# Patient Record
Sex: MALE | Race: WHITE | Employment: UNEMPLOYED | URBAN - NONMETROPOLITAN AREA
[De-identification: names, ages, dates, MRNs, and addresses within clinical notes are randomized per-mention and may not be internally consistent; named-entity substitution may affect disease eponyms.]

---

## 2019-03-10 ENCOUNTER — APPOINTMENT (OUTPATIENT)
Dept: CT IMAGING | Age: 59
DRG: 194 | End: 2019-03-10

## 2019-03-10 ENCOUNTER — APPOINTMENT (OUTPATIENT)
Dept: GENERAL RADIOLOGY | Age: 59
DRG: 194 | End: 2019-03-10

## 2019-03-10 ENCOUNTER — HOSPITAL ENCOUNTER (INPATIENT)
Age: 59
LOS: 3 days | Discharge: HOME | DRG: 194 | End: 2019-03-13
Attending: EMERGENCY MEDICINE | Admitting: INTERNAL MEDICINE

## 2019-03-10 DIAGNOSIS — J10.1 INFLUENZA A: ICD-10-CM

## 2019-03-10 DIAGNOSIS — R00.1 BRADYCARDIA: Primary | ICD-10-CM

## 2019-03-10 LAB
A/G RATIO: 1.2 (ref 1.1–2.2)
ALBUMIN SERPL-MCNC: 4.2 G/DL (ref 3.4–5)
ALP BLD-CCNC: 68 U/L (ref 40–129)
ALT SERPL-CCNC: 21 U/L (ref 10–40)
AMPHETAMINE SCREEN, URINE: ABNORMAL
ANION GAP SERPL CALCULATED.3IONS-SCNC: 13 MMOL/L (ref 3–16)
AST SERPL-CCNC: 22 U/L (ref 15–37)
BARBITURATE SCREEN URINE: ABNORMAL
BASOPHILS ABSOLUTE: 0 K/UL (ref 0–0.2)
BASOPHILS RELATIVE PERCENT: 0.6 %
BENZODIAZEPINE SCREEN, URINE: ABNORMAL
BILIRUB SERPL-MCNC: 0.3 MG/DL (ref 0–1)
BILIRUBIN URINE: NEGATIVE
BLOOD, URINE: ABNORMAL
BUN BLDV-MCNC: 31 MG/DL (ref 7–20)
CALCIUM SERPL-MCNC: 9.5 MG/DL (ref 8.3–10.6)
CANNABINOID SCREEN URINE: POSITIVE
CHLORIDE BLD-SCNC: 100 MMOL/L (ref 99–110)
CLARITY: CLEAR
CO2: 26 MMOL/L (ref 21–32)
COCAINE METABOLITE SCREEN URINE: ABNORMAL
COLOR: YELLOW
CREAT SERPL-MCNC: 0.9 MG/DL (ref 0.9–1.3)
EOSINOPHILS ABSOLUTE: 0 K/UL (ref 0–0.6)
EOSINOPHILS RELATIVE PERCENT: 0 %
EPITHELIAL CELLS, UA: ABNORMAL /HPF
GFR AFRICAN AMERICAN: >60
GFR NON-AFRICAN AMERICAN: >60
GLOBULIN: 3.6 G/DL
GLUCOSE BLD-MCNC: 127 MG/DL (ref 70–99)
GLUCOSE URINE: NEGATIVE MG/DL
HCT VFR BLD CALC: 48 % (ref 40.5–52.5)
HEMOGLOBIN: 15.9 G/DL (ref 13.5–17.5)
KETONES, URINE: ABNORMAL MG/DL
LEUKOCYTE ESTERASE, URINE: NEGATIVE
LIPASE: 12 U/L (ref 13–60)
LYMPHOCYTES ABSOLUTE: 0.6 K/UL (ref 1–5.1)
LYMPHOCYTES RELATIVE PERCENT: 10.3 %
Lab: ABNORMAL
MCH RBC QN AUTO: 31 PG (ref 26–34)
MCHC RBC AUTO-ENTMCNC: 33.1 G/DL (ref 31–36)
MCV RBC AUTO: 93.9 FL (ref 80–100)
METHADONE SCREEN, URINE: ABNORMAL
MICROSCOPIC EXAMINATION: YES
MONOCYTES ABSOLUTE: 0.6 K/UL (ref 0–1.3)
MONOCYTES RELATIVE PERCENT: 9.9 %
MUCUS: ABNORMAL /LPF
NEUTROPHILS ABSOLUTE: 4.6 K/UL (ref 1.7–7.7)
NEUTROPHILS RELATIVE PERCENT: 79.2 %
NITRITE, URINE: NEGATIVE
OPIATE SCREEN URINE: ABNORMAL
OXYCODONE URINE: ABNORMAL
PDW BLD-RTO: 14.2 % (ref 12.4–15.4)
PH UA: 5
PH UA: 5 (ref 5–8)
PHENCYCLIDINE SCREEN URINE: ABNORMAL
PLATELET # BLD: 181 K/UL (ref 135–450)
PMV BLD AUTO: 8.8 FL (ref 5–10.5)
POTASSIUM SERPL-SCNC: 4.3 MMOL/L (ref 3.5–5.1)
PROPOXYPHENE SCREEN: ABNORMAL
PROTEIN UA: 30 MG/DL
RAPID INFLUENZA  B AGN: NEGATIVE
RAPID INFLUENZA A AGN: POSITIVE
RBC # BLD: 5.11 M/UL (ref 4.2–5.9)
RBC UA: ABNORMAL /HPF (ref 0–2)
SODIUM BLD-SCNC: 139 MMOL/L (ref 136–145)
SPECIFIC GRAVITY UA: >=1.03 (ref 1–1.03)
TOTAL PROTEIN: 7.8 G/DL (ref 6.4–8.2)
TROPONIN: <0.01 NG/ML
URINE TYPE: ABNORMAL
UROBILINOGEN, URINE: 0.2 E.U./DL
WBC # BLD: 5.8 K/UL (ref 4–11)
WBC UA: ABNORMAL /HPF (ref 0–5)

## 2019-03-10 PROCEDURE — G0378 HOSPITAL OBSERVATION PER HR: HCPCS

## 2019-03-10 PROCEDURE — 6360000002 HC RX W HCPCS: Performed by: EMERGENCY MEDICINE

## 2019-03-10 PROCEDURE — 83690 ASSAY OF LIPASE: CPT

## 2019-03-10 PROCEDURE — 99285 EMERGENCY DEPT VISIT HI MDM: CPT

## 2019-03-10 PROCEDURE — 70450 CT HEAD/BRAIN W/O DYE: CPT

## 2019-03-10 PROCEDURE — 2580000003 HC RX 258: Performed by: INTERNAL MEDICINE

## 2019-03-10 PROCEDURE — 36415 COLL VENOUS BLD VENIPUNCTURE: CPT

## 2019-03-10 PROCEDURE — 96374 THER/PROPH/DIAG INJ IV PUSH: CPT

## 2019-03-10 PROCEDURE — 6370000000 HC RX 637 (ALT 250 FOR IP): Performed by: INTERNAL MEDICINE

## 2019-03-10 PROCEDURE — 2060000000 HC ICU INTERMEDIATE R&B

## 2019-03-10 PROCEDURE — 85025 COMPLETE CBC W/AUTO DIFF WBC: CPT

## 2019-03-10 PROCEDURE — 80053 COMPREHEN METABOLIC PANEL: CPT

## 2019-03-10 PROCEDURE — 87804 INFLUENZA ASSAY W/OPTIC: CPT

## 2019-03-10 PROCEDURE — 84484 ASSAY OF TROPONIN QUANT: CPT

## 2019-03-10 PROCEDURE — 71045 X-RAY EXAM CHEST 1 VIEW: CPT

## 2019-03-10 PROCEDURE — 93005 ELECTROCARDIOGRAM TRACING: CPT | Performed by: EMERGENCY MEDICINE

## 2019-03-10 PROCEDURE — 2580000003 HC RX 258: Performed by: EMERGENCY MEDICINE

## 2019-03-10 PROCEDURE — 81001 URINALYSIS AUTO W/SCOPE: CPT

## 2019-03-10 PROCEDURE — 74177 CT ABD & PELVIS W/CONTRAST: CPT

## 2019-03-10 PROCEDURE — 6360000004 HC RX CONTRAST MEDICATION: Performed by: EMERGENCY MEDICINE

## 2019-03-10 PROCEDURE — 80307 DRUG TEST PRSMV CHEM ANLYZR: CPT

## 2019-03-10 PROCEDURE — 84443 ASSAY THYROID STIM HORMONE: CPT

## 2019-03-10 PROCEDURE — 6360000002 HC RX W HCPCS: Performed by: INTERNAL MEDICINE

## 2019-03-10 RX ORDER — SODIUM CHLORIDE 0.9 % (FLUSH) 0.9 %
10 SYRINGE (ML) INJECTION EVERY 12 HOURS SCHEDULED
Status: DISCONTINUED | OUTPATIENT
Start: 2019-03-10 | End: 2019-03-13 | Stop reason: HOSPADM

## 2019-03-10 RX ORDER — SODIUM CHLORIDE 0.9 % (FLUSH) 0.9 %
10 SYRINGE (ML) INJECTION PRN
Status: DISCONTINUED | OUTPATIENT
Start: 2019-03-10 | End: 2019-03-13 | Stop reason: HOSPADM

## 2019-03-10 RX ORDER — OSELTAMIVIR PHOSPHATE 75 MG/1
75 CAPSULE ORAL 2 TIMES DAILY
Status: DISCONTINUED | OUTPATIENT
Start: 2019-03-10 | End: 2019-03-13 | Stop reason: HOSPADM

## 2019-03-10 RX ORDER — ONDANSETRON 2 MG/ML
4 INJECTION INTRAMUSCULAR; INTRAVENOUS EVERY 6 HOURS PRN
Status: DISCONTINUED | OUTPATIENT
Start: 2019-03-10 | End: 2019-03-13 | Stop reason: HOSPADM

## 2019-03-10 RX ORDER — 0.9 % SODIUM CHLORIDE 0.9 %
1000 INTRAVENOUS SOLUTION INTRAVENOUS ONCE
Status: COMPLETED | OUTPATIENT
Start: 2019-03-10 | End: 2019-03-10

## 2019-03-10 RX ORDER — ONDANSETRON 2 MG/ML
4 INJECTION INTRAMUSCULAR; INTRAVENOUS ONCE
Status: COMPLETED | OUTPATIENT
Start: 2019-03-10 | End: 2019-03-10

## 2019-03-10 RX ADMIN — OSELTAMIVIR PHOSPHATE 75 MG: 75 CAPSULE ORAL at 23:35

## 2019-03-10 RX ADMIN — ONDANSETRON 4 MG: 2 INJECTION INTRAMUSCULAR; INTRAVENOUS at 23:35

## 2019-03-10 RX ADMIN — IOPAMIDOL 75 ML: 755 INJECTION, SOLUTION INTRAVENOUS at 16:41

## 2019-03-10 RX ADMIN — SODIUM CHLORIDE 1000 ML: 9 INJECTION, SOLUTION INTRAVENOUS at 15:47

## 2019-03-10 RX ADMIN — ONDANSETRON 4 MG: 2 INJECTION INTRAMUSCULAR; INTRAVENOUS at 15:46

## 2019-03-10 RX ADMIN — Medication 10 ML: at 23:35

## 2019-03-10 SDOH — HEALTH STABILITY: MENTAL HEALTH: HOW OFTEN DO YOU HAVE A DRINK CONTAINING ALCOHOL?: NEVER

## 2019-03-10 ASSESSMENT — ENCOUNTER SYMPTOMS
ABDOMINAL PAIN: 1
SORE THROAT: 0
EYE DISCHARGE: 0
VOMITING: 1
EYE REDNESS: 0
BACK PAIN: 0
NAUSEA: 1
SHORTNESS OF BREATH: 0
DIARRHEA: 0
CONSTIPATION: 0
COUGH: 1
RHINORRHEA: 0

## 2019-03-10 ASSESSMENT — PAIN DESCRIPTION - FREQUENCY: FREQUENCY: CONTINUOUS

## 2019-03-10 ASSESSMENT — PAIN DESCRIPTION - LOCATION: LOCATION: ABDOMEN

## 2019-03-10 ASSESSMENT — PAIN - FUNCTIONAL ASSESSMENT: PAIN_FUNCTIONAL_ASSESSMENT: ACTIVITIES ARE NOT PREVENTED

## 2019-03-10 ASSESSMENT — PAIN DESCRIPTION - PAIN TYPE: TYPE: ACUTE PAIN

## 2019-03-10 ASSESSMENT — PAIN DESCRIPTION - ORIENTATION: ORIENTATION: LOWER;MID

## 2019-03-10 ASSESSMENT — PAIN DESCRIPTION - PROGRESSION: CLINICAL_PROGRESSION: NOT CHANGED

## 2019-03-10 ASSESSMENT — PAIN DESCRIPTION - DESCRIPTORS: DESCRIPTORS: ACHING

## 2019-03-10 ASSESSMENT — PAIN SCALES - GENERAL: PAINLEVEL_OUTOF10: 4

## 2019-03-10 ASSESSMENT — PAIN DESCRIPTION - ONSET: ONSET: SUDDEN

## 2019-03-11 ENCOUNTER — COMMUNITY OUTREACH (OUTPATIENT)
Dept: OTHER | Age: 59
End: 2019-03-11

## 2019-03-11 PROBLEM — J10.1 INFLUENZA A: Status: ACTIVE | Noted: 2019-03-11

## 2019-03-11 PROBLEM — R31.29 MICROSCOPIC HEMATURIA: Status: ACTIVE | Noted: 2019-03-11

## 2019-03-11 PROBLEM — R91.1 PULMONARY NODULE: Status: ACTIVE | Noted: 2019-03-11

## 2019-03-11 LAB
EKG ATRIAL RATE: 47 BPM
EKG DIAGNOSIS: NORMAL
EKG P AXIS: 82 DEGREES
EKG P-R INTERVAL: 148 MS
EKG Q-T INTERVAL: 436 MS
EKG QRS DURATION: 92 MS
EKG QTC CALCULATION (BAZETT): 385 MS
EKG R AXIS: 95 DEGREES
EKG T AXIS: 71 DEGREES
EKG VENTRICULAR RATE: 47 BPM
TSH REFLEX: 0.94 UIU/ML (ref 0.27–4.2)

## 2019-03-11 PROCEDURE — 99253 IP/OBS CNSLTJ NEW/EST LOW 45: CPT | Performed by: INTERNAL MEDICINE

## 2019-03-11 PROCEDURE — 99223 1ST HOSP IP/OBS HIGH 75: CPT | Performed by: INTERNAL MEDICINE

## 2019-03-11 PROCEDURE — 6370000000 HC RX 637 (ALT 250 FOR IP): Performed by: INTERNAL MEDICINE

## 2019-03-11 PROCEDURE — 2060000000 HC ICU INTERMEDIATE R&B

## 2019-03-11 PROCEDURE — G0378 HOSPITAL OBSERVATION PER HR: HCPCS

## 2019-03-11 PROCEDURE — 2580000003 HC RX 258: Performed by: INTERNAL MEDICINE

## 2019-03-11 PROCEDURE — 93010 ELECTROCARDIOGRAM REPORT: CPT | Performed by: INTERNAL MEDICINE

## 2019-03-11 PROCEDURE — 6360000002 HC RX W HCPCS: Performed by: INTERNAL MEDICINE

## 2019-03-11 RX ADMIN — Medication 10 ML: at 09:43

## 2019-03-11 RX ADMIN — Medication 10 ML: at 21:19

## 2019-03-11 RX ADMIN — OSELTAMIVIR PHOSPHATE 75 MG: 75 CAPSULE ORAL at 09:43

## 2019-03-11 RX ADMIN — OSELTAMIVIR PHOSPHATE 75 MG: 75 CAPSULE ORAL at 21:19

## 2019-03-11 RX ADMIN — ENOXAPARIN SODIUM 40 MG: 40 INJECTION SUBCUTANEOUS at 09:43

## 2019-03-11 ASSESSMENT — PAIN DESCRIPTION - ONSET
ONSET: ON-GOING

## 2019-03-11 ASSESSMENT — PAIN DESCRIPTION - DESCRIPTORS
DESCRIPTORS: ACHING

## 2019-03-11 ASSESSMENT — PAIN SCALES - GENERAL
PAINLEVEL_OUTOF10: 1

## 2019-03-11 ASSESSMENT — PAIN DESCRIPTION - LOCATION
LOCATION: RIB CAGE

## 2019-03-11 ASSESSMENT — PAIN DESCRIPTION - PAIN TYPE
TYPE: ACUTE PAIN

## 2019-03-11 ASSESSMENT — PAIN DESCRIPTION - FREQUENCY
FREQUENCY: CONTINUOUS

## 2019-03-12 LAB
ALBUMIN SERPL-MCNC: 3.4 G/DL (ref 3.4–5)
ANION GAP SERPL CALCULATED.3IONS-SCNC: 11 MMOL/L (ref 3–16)
ANION GAP SERPL CALCULATED.3IONS-SCNC: 11 MMOL/L (ref 3–16)
BUN BLDV-MCNC: 25 MG/DL (ref 7–20)
BUN BLDV-MCNC: 26 MG/DL (ref 7–20)
CALCIUM SERPL-MCNC: 8.6 MG/DL (ref 8.3–10.6)
CALCIUM SERPL-MCNC: 8.7 MG/DL (ref 8.3–10.6)
CHLORIDE BLD-SCNC: 97 MMOL/L (ref 99–110)
CHLORIDE BLD-SCNC: 99 MMOL/L (ref 99–110)
CO2: 23 MMOL/L (ref 21–32)
CO2: 23 MMOL/L (ref 21–32)
CREAT SERPL-MCNC: 0.8 MG/DL (ref 0.9–1.3)
CREAT SERPL-MCNC: 0.8 MG/DL (ref 0.9–1.3)
EKG ATRIAL RATE: 234 BPM
EKG DIAGNOSIS: NORMAL
EKG Q-T INTERVAL: 370 MS
EKG QRS DURATION: 88 MS
EKG QTC CALCULATION (BAZETT): 445 MS
EKG R AXIS: 112 DEGREES
EKG T AXIS: 59 DEGREES
EKG VENTRICULAR RATE: 87 BPM
GFR AFRICAN AMERICAN: >60
GFR AFRICAN AMERICAN: >60
GFR NON-AFRICAN AMERICAN: >60
GFR NON-AFRICAN AMERICAN: >60
GLUCOSE BLD-MCNC: 88 MG/DL (ref 70–99)
GLUCOSE BLD-MCNC: 89 MG/DL (ref 70–99)
MAGNESIUM: 2 MG/DL (ref 1.8–2.4)
PHOSPHORUS: 2.7 MG/DL (ref 2.5–4.9)
POTASSIUM SERPL-SCNC: 4.1 MMOL/L (ref 3.5–5.1)
POTASSIUM SERPL-SCNC: 4.2 MMOL/L (ref 3.5–5.1)
SODIUM BLD-SCNC: 131 MMOL/L (ref 136–145)
SODIUM BLD-SCNC: 133 MMOL/L (ref 136–145)
TROPONIN: <0.01 NG/ML
TROPONIN: <0.01 NG/ML

## 2019-03-12 PROCEDURE — 93010 ELECTROCARDIOGRAM REPORT: CPT | Performed by: INTERNAL MEDICINE

## 2019-03-12 PROCEDURE — 84484 ASSAY OF TROPONIN QUANT: CPT

## 2019-03-12 PROCEDURE — 2060000000 HC ICU INTERMEDIATE R&B

## 2019-03-12 PROCEDURE — 36415 COLL VENOUS BLD VENIPUNCTURE: CPT

## 2019-03-12 PROCEDURE — 99233 SBSQ HOSP IP/OBS HIGH 50: CPT | Performed by: INTERNAL MEDICINE

## 2019-03-12 PROCEDURE — 2580000003 HC RX 258: Performed by: INTERNAL MEDICINE

## 2019-03-12 PROCEDURE — 6360000002 HC RX W HCPCS: Performed by: INTERNAL MEDICINE

## 2019-03-12 PROCEDURE — 93005 ELECTROCARDIOGRAM TRACING: CPT | Performed by: HOSPITALIST

## 2019-03-12 PROCEDURE — 6370000000 HC RX 637 (ALT 250 FOR IP): Performed by: INTERNAL MEDICINE

## 2019-03-12 PROCEDURE — 80069 RENAL FUNCTION PANEL: CPT

## 2019-03-12 PROCEDURE — 83735 ASSAY OF MAGNESIUM: CPT

## 2019-03-12 RX ORDER — ASPIRIN 81 MG/1
81 TABLET, CHEWABLE ORAL DAILY
Status: DISCONTINUED | OUTPATIENT
Start: 2019-03-12 | End: 2019-03-13 | Stop reason: HOSPADM

## 2019-03-12 RX ORDER — 0.9 % SODIUM CHLORIDE 0.9 %
500 INTRAVENOUS SOLUTION INTRAVENOUS ONCE
Status: COMPLETED | OUTPATIENT
Start: 2019-03-12 | End: 2019-03-12

## 2019-03-12 RX ADMIN — AMIODARONE HYDROCHLORIDE 1 MG/MIN: 50 INJECTION, SOLUTION INTRAVENOUS at 12:43

## 2019-03-12 RX ADMIN — SODIUM CHLORIDE 500 ML: 9 INJECTION, SOLUTION INTRAVENOUS at 11:57

## 2019-03-12 RX ADMIN — ASPIRIN 81 MG 81 MG: 81 TABLET ORAL at 11:33

## 2019-03-12 RX ADMIN — ENOXAPARIN SODIUM 40 MG: 40 INJECTION SUBCUTANEOUS at 11:34

## 2019-03-12 RX ADMIN — OSELTAMIVIR PHOSPHATE 75 MG: 75 CAPSULE ORAL at 21:08

## 2019-03-12 RX ADMIN — Medication 10 ML: at 11:34

## 2019-03-12 RX ADMIN — DEXTROSE MONOHYDRATE 150 MG: 50 INJECTION, SOLUTION INTRAVENOUS at 12:26

## 2019-03-12 RX ADMIN — OSELTAMIVIR PHOSPHATE 75 MG: 75 CAPSULE ORAL at 11:33

## 2019-03-12 RX ADMIN — AMIODARONE HYDROCHLORIDE 0.5 MG/MIN: 50 INJECTION, SOLUTION INTRAVENOUS at 18:45

## 2019-03-12 RX ADMIN — Medication 10 ML: at 21:08

## 2019-03-13 ENCOUNTER — APPOINTMENT (OUTPATIENT)
Dept: NUCLEAR MEDICINE | Age: 59
DRG: 194 | End: 2019-03-13

## 2019-03-13 VITALS
WEIGHT: 132.8 LBS | RESPIRATION RATE: 12 BRPM | DIASTOLIC BLOOD PRESSURE: 62 MMHG | SYSTOLIC BLOOD PRESSURE: 104 MMHG | BODY MASS INDEX: 17.6 KG/M2 | TEMPERATURE: 98 F | HEART RATE: 51 BPM | HEIGHT: 73 IN | OXYGEN SATURATION: 94 %

## 2019-03-13 PROBLEM — R00.1 BRADYCARDIA: Status: RESOLVED | Noted: 2019-03-10 | Resolved: 2019-03-13

## 2019-03-13 PROBLEM — J10.1 INFLUENZA A: Status: RESOLVED | Noted: 2019-03-11 | Resolved: 2019-03-13

## 2019-03-13 LAB
LV EF: 50 %
LV EF: 53 %
LVEF MODALITY: NORMAL
LVEF MODALITY: NORMAL

## 2019-03-13 PROCEDURE — 99239 HOSP IP/OBS DSCHRG MGMT >30: CPT | Performed by: INTERNAL MEDICINE

## 2019-03-13 PROCEDURE — 3430000000 HC RX DIAGNOSTIC RADIOPHARMACEUTICAL: Performed by: INTERNAL MEDICINE

## 2019-03-13 PROCEDURE — 93017 CV STRESS TEST TRACING ONLY: CPT

## 2019-03-13 PROCEDURE — 2580000003 HC RX 258: Performed by: INTERNAL MEDICINE

## 2019-03-13 PROCEDURE — 6370000000 HC RX 637 (ALT 250 FOR IP): Performed by: INTERNAL MEDICINE

## 2019-03-13 PROCEDURE — 6360000002 HC RX W HCPCS: Performed by: INTERNAL MEDICINE

## 2019-03-13 PROCEDURE — A9502 TC99M TETROFOSMIN: HCPCS | Performed by: INTERNAL MEDICINE

## 2019-03-13 PROCEDURE — 78452 HT MUSCLE IMAGE SPECT MULT: CPT

## 2019-03-13 PROCEDURE — 99233 SBSQ HOSP IP/OBS HIGH 50: CPT | Performed by: INTERNAL MEDICINE

## 2019-03-13 RX ORDER — OSELTAMIVIR PHOSPHATE 75 MG/1
75 CAPSULE ORAL 2 TIMES DAILY
Qty: 4 CAPSULE | Refills: 0 | Status: SHIPPED | OUTPATIENT
Start: 2019-03-13 | End: 2019-03-15

## 2019-03-13 RX ORDER — ASPIRIN 81 MG/1
81 TABLET, CHEWABLE ORAL DAILY
Qty: 30 TABLET | Refills: 0 | Status: SHIPPED | OUTPATIENT
Start: 2019-03-14 | End: 2019-03-26

## 2019-03-13 RX ORDER — AMIODARONE HYDROCHLORIDE 200 MG/1
200 TABLET ORAL 2 TIMES DAILY
Status: DISCONTINUED | OUTPATIENT
Start: 2019-03-13 | End: 2019-03-13 | Stop reason: HOSPADM

## 2019-03-13 RX ORDER — METOPROLOL SUCCINATE 25 MG/1
12.5 TABLET, EXTENDED RELEASE ORAL DAILY
Qty: 30 TABLET | Refills: 0 | Status: SHIPPED | OUTPATIENT
Start: 2019-03-13 | End: 2019-03-26

## 2019-03-13 RX ORDER — ALBUTEROL SULFATE 90 UG/1
2 AEROSOL, METERED RESPIRATORY (INHALATION) 4 TIMES DAILY PRN
Qty: 1 INHALER | Refills: 0 | Status: SHIPPED | OUTPATIENT
Start: 2019-03-13 | End: 2019-03-26

## 2019-03-13 RX ADMIN — TETROFOSMIN 30.7 MILLICURIE: 0.23 INJECTION, POWDER, LYOPHILIZED, FOR SOLUTION INTRAVENOUS at 13:09

## 2019-03-13 RX ADMIN — AMIODARONE HYDROCHLORIDE 0.5 MG/MIN: 50 INJECTION, SOLUTION INTRAVENOUS at 03:31

## 2019-03-13 RX ADMIN — AMIODARONE HYDROCHLORIDE 200 MG: 200 TABLET ORAL at 10:39

## 2019-03-13 RX ADMIN — ASPIRIN 81 MG 81 MG: 81 TABLET ORAL at 15:05

## 2019-03-13 RX ADMIN — OSELTAMIVIR PHOSPHATE 75 MG: 75 CAPSULE ORAL at 15:05

## 2019-03-13 RX ADMIN — TETROFOSMIN 9.9 MILLICURIE: 0.23 INJECTION, POWDER, LYOPHILIZED, FOR SOLUTION INTRAVENOUS at 12:07

## 2019-03-13 RX ADMIN — REGADENOSON 0.4 MG: 0.08 INJECTION, SOLUTION INTRAVENOUS at 13:09

## 2019-03-13 ASSESSMENT — PAIN SCALES - GENERAL
PAINLEVEL_OUTOF10: 0
PAINLEVEL_OUTOF10: 0

## 2019-03-19 ENCOUNTER — OFFICE VISIT (OUTPATIENT)
Dept: INTERNAL MEDICINE CLINIC | Age: 59
End: 2019-03-19

## 2019-03-19 VITALS
HEART RATE: 60 BPM | SYSTOLIC BLOOD PRESSURE: 116 MMHG | BODY MASS INDEX: 17.48 KG/M2 | WEIGHT: 132.5 LBS | OXYGEN SATURATION: 97 % | DIASTOLIC BLOOD PRESSURE: 70 MMHG

## 2019-03-19 DIAGNOSIS — I48.0 PAROXYSMAL ATRIAL FIBRILLATION (HCC): Primary | ICD-10-CM

## 2019-03-19 DIAGNOSIS — F17.200 SMOKER: ICD-10-CM

## 2019-03-19 DIAGNOSIS — B94.9 POST-INFECTION FATIGUE: ICD-10-CM

## 2019-03-19 DIAGNOSIS — R53.83 POST-INFECTION FATIGUE: ICD-10-CM

## 2019-03-19 PROCEDURE — 99204 OFFICE O/P NEW MOD 45 MIN: CPT | Performed by: INTERNAL MEDICINE

## 2019-03-26 ENCOUNTER — OFFICE VISIT (OUTPATIENT)
Dept: CARDIOLOGY CLINIC | Age: 59
End: 2019-03-26

## 2019-03-26 VITALS
HEART RATE: 72 BPM | SYSTOLIC BLOOD PRESSURE: 128 MMHG | OXYGEN SATURATION: 98 % | BODY MASS INDEX: 18.56 KG/M2 | HEIGHT: 72 IN | DIASTOLIC BLOOD PRESSURE: 74 MMHG | WEIGHT: 137 LBS

## 2019-03-26 DIAGNOSIS — I48.0 PAROXYSMAL A-FIB (HCC): Primary | ICD-10-CM

## 2019-03-26 DIAGNOSIS — I47.29 NSVT (NONSUSTAINED VENTRICULAR TACHYCARDIA): ICD-10-CM

## 2019-03-26 PROCEDURE — 99213 OFFICE O/P EST LOW 20 MIN: CPT | Performed by: INTERNAL MEDICINE

## 2022-12-03 ENCOUNTER — APPOINTMENT (OUTPATIENT)
Dept: CT IMAGING | Age: 62
End: 2022-12-03

## 2022-12-03 ENCOUNTER — APPOINTMENT (OUTPATIENT)
Dept: GENERAL RADIOLOGY | Age: 62
End: 2022-12-03

## 2022-12-03 ENCOUNTER — HOSPITAL ENCOUNTER (EMERGENCY)
Age: 62
Discharge: HOME | End: 2022-12-03
Attending: STUDENT IN AN ORGANIZED HEALTH CARE EDUCATION/TRAINING PROGRAM

## 2022-12-03 VITALS
TEMPERATURE: 98.1 F | HEIGHT: 73 IN | SYSTOLIC BLOOD PRESSURE: 105 MMHG | RESPIRATION RATE: 20 BRPM | WEIGHT: 155 LBS | DIASTOLIC BLOOD PRESSURE: 62 MMHG | OXYGEN SATURATION: 98 % | HEART RATE: 44 BPM | BODY MASS INDEX: 20.54 KG/M2

## 2022-12-03 DIAGNOSIS — R11.0 NAUSEA: ICD-10-CM

## 2022-12-03 DIAGNOSIS — U07.1 COVID: Primary | ICD-10-CM

## 2022-12-03 LAB
A/G RATIO: 1.2 (ref 1.1–2.2)
ALBUMIN SERPL-MCNC: 4.5 G/DL (ref 3.4–5)
ALP BLD-CCNC: 70 U/L (ref 40–129)
ALT SERPL-CCNC: 20 U/L (ref 10–40)
ANION GAP SERPL CALCULATED.3IONS-SCNC: 12 MMOL/L (ref 3–16)
AST SERPL-CCNC: 23 U/L (ref 15–37)
BASOPHILS ABSOLUTE: 0 K/UL (ref 0–0.2)
BASOPHILS RELATIVE PERCENT: 0.3 %
BILIRUB SERPL-MCNC: 0.3 MG/DL (ref 0–1)
BUN BLDV-MCNC: 32 MG/DL (ref 7–20)
CALCIUM SERPL-MCNC: 9.2 MG/DL (ref 8.3–10.6)
CHLORIDE BLD-SCNC: 94 MMOL/L (ref 99–110)
CO2: 26 MMOL/L (ref 21–32)
CREAT SERPL-MCNC: 1 MG/DL (ref 0.8–1.3)
EKG ATRIAL RATE: 42 BPM
EKG DIAGNOSIS: NORMAL
EKG P AXIS: 66 DEGREES
EKG P-R INTERVAL: 136 MS
EKG Q-T INTERVAL: 466 MS
EKG QRS DURATION: 96 MS
EKG QTC CALCULATION (BAZETT): 389 MS
EKG R AXIS: -45 DEGREES
EKG T AXIS: 69 DEGREES
EKG VENTRICULAR RATE: 42 BPM
EOSINOPHILS ABSOLUTE: 0 K/UL (ref 0–0.6)
EOSINOPHILS RELATIVE PERCENT: 0.4 %
GFR SERPL CREATININE-BSD FRML MDRD: >60 ML/MIN/{1.73_M2}
GLUCOSE BLD-MCNC: 124 MG/DL (ref 70–99)
HCT VFR BLD CALC: 49.5 % (ref 40.5–52.5)
HEMOGLOBIN: 16.3 G/DL (ref 13.5–17.5)
INFLUENZA A: NOT DETECTED
INFLUENZA B: NOT DETECTED
LIPASE: 19 U/L (ref 13–60)
LYMPHOCYTES ABSOLUTE: 0.9 K/UL (ref 1–5.1)
LYMPHOCYTES RELATIVE PERCENT: 12.8 %
MCH RBC QN AUTO: 31.1 PG (ref 26–34)
MCHC RBC AUTO-ENTMCNC: 33 G/DL (ref 31–36)
MCV RBC AUTO: 94.5 FL (ref 80–100)
MONOCYTES ABSOLUTE: 0.6 K/UL (ref 0–1.3)
MONOCYTES RELATIVE PERCENT: 8.4 %
NEUTROPHILS ABSOLUTE: 5.8 K/UL (ref 1.7–7.7)
NEUTROPHILS RELATIVE PERCENT: 78.1 %
PDW BLD-RTO: 13.8 % (ref 12.4–15.4)
PLATELET # BLD: 192 K/UL (ref 135–450)
PMV BLD AUTO: 8.7 FL (ref 5–10.5)
POTASSIUM REFLEX MAGNESIUM: 3.7 MMOL/L (ref 3.5–5.1)
RBC # BLD: 5.24 M/UL (ref 4.2–5.9)
SARS-COV-2 RNA, RT PCR: DETECTED
SODIUM BLD-SCNC: 132 MMOL/L (ref 136–145)
TOTAL PROTEIN: 8.3 G/DL (ref 6.4–8.2)
TROPONIN: <0.01 NG/ML
WBC # BLD: 7.4 K/UL (ref 4–11)

## 2022-12-03 PROCEDURE — 99285 EMERGENCY DEPT VISIT HI MDM: CPT

## 2022-12-03 PROCEDURE — 6360000002 HC RX W HCPCS: Performed by: STUDENT IN AN ORGANIZED HEALTH CARE EDUCATION/TRAINING PROGRAM

## 2022-12-03 PROCEDURE — 96374 THER/PROPH/DIAG INJ IV PUSH: CPT

## 2022-12-03 PROCEDURE — 85025 COMPLETE CBC W/AUTO DIFF WBC: CPT

## 2022-12-03 PROCEDURE — 36415 COLL VENOUS BLD VENIPUNCTURE: CPT

## 2022-12-03 PROCEDURE — 96375 TX/PRO/DX INJ NEW DRUG ADDON: CPT

## 2022-12-03 PROCEDURE — 2580000003 HC RX 258: Performed by: STUDENT IN AN ORGANIZED HEALTH CARE EDUCATION/TRAINING PROGRAM

## 2022-12-03 PROCEDURE — 74177 CT ABD & PELVIS W/CONTRAST: CPT

## 2022-12-03 PROCEDURE — 84484 ASSAY OF TROPONIN QUANT: CPT

## 2022-12-03 PROCEDURE — 6360000004 HC RX CONTRAST MEDICATION: Performed by: STUDENT IN AN ORGANIZED HEALTH CARE EDUCATION/TRAINING PROGRAM

## 2022-12-03 PROCEDURE — 80053 COMPREHEN METABOLIC PANEL: CPT

## 2022-12-03 PROCEDURE — 83690 ASSAY OF LIPASE: CPT

## 2022-12-03 PROCEDURE — 87636 SARSCOV2 & INF A&B AMP PRB: CPT

## 2022-12-03 PROCEDURE — 71045 X-RAY EXAM CHEST 1 VIEW: CPT

## 2022-12-03 PROCEDURE — 93005 ELECTROCARDIOGRAM TRACING: CPT | Performed by: STUDENT IN AN ORGANIZED HEALTH CARE EDUCATION/TRAINING PROGRAM

## 2022-12-03 RX ORDER — KETOROLAC TROMETHAMINE 30 MG/ML
15 INJECTION, SOLUTION INTRAMUSCULAR; INTRAVENOUS ONCE
Status: COMPLETED | OUTPATIENT
Start: 2022-12-03 | End: 2022-12-03

## 2022-12-03 RX ORDER — ONDANSETRON 4 MG/1
4 TABLET, ORALLY DISINTEGRATING ORAL 3 TIMES DAILY PRN
Qty: 21 TABLET | Refills: 0 | Status: SHIPPED | OUTPATIENT
Start: 2022-12-03

## 2022-12-03 RX ORDER — 0.9 % SODIUM CHLORIDE 0.9 %
2000 INTRAVENOUS SOLUTION INTRAVENOUS ONCE
Status: COMPLETED | OUTPATIENT
Start: 2022-12-03 | End: 2022-12-03

## 2022-12-03 RX ORDER — ONDANSETRON 2 MG/ML
4 INJECTION INTRAMUSCULAR; INTRAVENOUS ONCE
Status: COMPLETED | OUTPATIENT
Start: 2022-12-03 | End: 2022-12-03

## 2022-12-03 RX ADMIN — KETOROLAC TROMETHAMINE 15 MG: 30 INJECTION, SOLUTION INTRAMUSCULAR at 05:12

## 2022-12-03 RX ADMIN — SODIUM CHLORIDE 2000 ML: 9 INJECTION, SOLUTION INTRAVENOUS at 05:12

## 2022-12-03 RX ADMIN — IOPAMIDOL 70 ML: 755 INJECTION, SOLUTION INTRAVENOUS at 05:44

## 2022-12-03 RX ADMIN — ONDANSETRON HYDROCHLORIDE 4 MG: 2 INJECTION, SOLUTION INTRAMUSCULAR; INTRAVENOUS at 05:12

## 2022-12-03 ASSESSMENT — PAIN - FUNCTIONAL ASSESSMENT: PAIN_FUNCTIONAL_ASSESSMENT: 0-10

## 2022-12-03 ASSESSMENT — PAIN SCALES - GENERAL
PAINLEVEL_OUTOF10: 3
PAINLEVEL_OUTOF10: 3

## 2022-12-03 ASSESSMENT — PAIN DESCRIPTION - LOCATION: LOCATION: ABDOMEN

## 2022-12-03 ASSESSMENT — PAIN DESCRIPTION - ORIENTATION: ORIENTATION: MID

## 2022-12-03 ASSESSMENT — PAIN DESCRIPTION - FREQUENCY: FREQUENCY: CONTINUOUS

## 2022-12-03 NOTE — DISCHARGE INSTRUCTIONS
Return the nearest ED if you develop severe chest pain, shortness of breath, or other concerning symptoms. Follow-up with your PCP in 3 to 5 days if not improving.

## 2022-12-03 NOTE — ED TRIAGE NOTES
Patient brought to triage in a wheelchair with complaint of abdominal pains and vomiting since Wednesday. Denies diarrhea and fever. Denies urinary symptoms.  GCS 15

## 2022-12-03 NOTE — ED PROVIDER NOTES
[address] ED      CHIEF COMPLAINT  Abdominal Pain and Emesis       HISTORY OF PRESENT ILLNESS  Benitez Scott is a 63 y.o. male  who presents to the ED complaining of nausea, vomiting, epigastric abdominal pain that started 2 to 3 days ago. Patient states that since Wednesday he is not been able to keep anything down and vomits anything that he tries to eat or drink. Emesis has been mostly the color whenever he drinks, but he has been seen some yellow emesis as well. Has also noted some dark-colored emesis yesterday. Has not noticed any fevers or chills. Does have a minimal cough that he states is chronic. Denies any diarrhea or constipation, dysuria, hematuria. Does not drink alcohol. Does occasionally use marijuana, last used 2 to 3 days ago. No other complaints, modifying factors or associated symptoms. I have reviewed the following from the nursing documentation. Past Medical History:   Diagnosis Date    Influenza A 03/10/2019     History reviewed. No pertinent surgical history.   Family History   Problem Relation Age of Onset    Heart Surgery Mother     High Cholesterol Mother     Heart Surgery Brother         valve     Social History     Socioeconomic History    Marital status: Single     Spouse name: Not on file    Number of children: Not on file    Years of education: Not on file    Highest education level: Not on file   Occupational History    Not on file   Tobacco Use    Smoking status: Every Day     Packs/day: 1.00     Years: 40.00     Pack years: 40.00     Types: Cigarettes    Smokeless tobacco: Never   Vaping Use    Vaping Use: Never used   Substance and Sexual Activity    Alcohol use: Never    Drug use: Yes     Types: Marijuana Jeanette Javier)     Comment: ocassionally    Sexual activity: Yes     Partners: Female   Other Topics Concern    Not on file   Social History Narrative    Not on file     Social Determinants of Health     Financial Resource Strain: Not on file   Food Insecurity: Not on file   Transportation Needs: Not on file   Physical Activity: Not on file   Stress: Not on file   Social Connections: Not on file   Intimate Partner Violence: Not on file   Housing Stability: Not on file     Current Facility-Administered Medications   Medication Dose Route Frequency Provider Last Rate Last Admin    0.9 % sodium chloride bolus  2,000 mL IntraVENous Once Ines Learn, DO        ketorolac (TORADOL) injection 15 mg  15 mg IntraVENous Once Ines Learn, DO        [facility]ron [facility]) injection 4 mg  4 mg IntraVENous Once Ines Learn, DO         No current outpatient medications on file. No Known Allergies    REVIEW OF SYSTEMS  10 systems reviewed, pertinent positives per HPI otherwise noted to be negative. PHYSICAL EXAM  /64   Pulse 50   Temp 98.1 °F (36.7 °C) (Oral)   Resp 20   Ht 6' 1\" (1.854 m)   Wt 155 lb (70.3 kg)   SpO2 91%   BMI 20.45 kg/m²    General: Appears well. Alert  HEENT: Head atraumatic, Eyes normal inspection, PERRL. Normal ENT inspection, Pharynx normal. No signs of dehydration  NECK: Normal inspection  RESPIRATORY: Normal breath sounds. No chest wall tenderness. No respiratory distress  CVS: Heart rate and rhythm regular. No Murmurs  ABDOMEN/GI: Soft, Non-tender, No distention  BACK: Normal inspection  EXTREMITIES: Non-Tender. Full ROM. Normal appearance. No Pedal edema  NEURO: Alert and oriented. Sensation normal. Motor normal  PSYCH: Mood normal. Affect normal.  SKIN: Color normal. No rash. Warm, Dry    LABS  I have reviewed all labs for this visit.    Results for orders placed or performed during the hospital encounter of 12/03/22   COVID-19 & Influenza Combo    Specimen: Nasopharyngeal Swab   Result Value Ref Range    SARS-CoV-2 RNA, RT PCR DETECTED (A) NOT DETECTED    INFLUENZA A NOT DETECTED NOT DETECTED    INFLUENZA B NOT DETECTED NOT DETECTED   CBC with Auto Differential   Result Value Ref Range    WBC 7.4 4.0 - 11.0 K/uL    RBC 5.24 4.20 - 5.90 M/uL    Hemoglobin 16.3 13.5 - 17.5 g/dL    Hematocrit 49.5 40.5 - 52.5 %    MCV 94.5 80.0 - 100.0 fL    MCH 31.1 26.0 - 34.0 pg    MCHC 33.0 31.0 - 36.0 g/dL    RDW 13.8 12.4 - 15.4 %    Platelets 160 567 - 246 K/uL    MPV 8.7 5.0 - 10.5 fL    Neutrophils % 78.1 %    Lymphocytes % 12.8 %    Monocytes % 8.4 %    Eosinophils % 0.4 %    Basophils % 0.3 %    Neutrophils Absolute 5.8 1.7 - 7.7 K/uL    Lymphocytes Absolute 0.9 (L) 1.0 - 5.1 K/uL    Monocytes Absolute 0.6 0.0 - 1.3 K/uL    Eosinophils Absolute 0.0 0.0 - 0.6 K/uL    Basophils Absolute 0.0 0.0 - 0.2 K/uL   CMP w/ Reflex to MG   Result Value Ref Range    Sodium 132 (L) 136 - 145 mmol/L    Potassium reflex Magnesium 3.7 3.5 - 5.1 mmol/L    Chloride 94 (L) 99 - 110 mmol/L    CO2 26 21 - 32 mmol/L    Anion Gap 12 3 - 16    Glucose 124 (H) 70 - 99 mg/dL    BUN 32 (H) 7 - 20 mg/dL    Creatinine 1.0 0.8 - 1.3 mg/dL    Est, Glom Filt Rate >60 >60    Calcium 9.2 8.3 - 10.6 mg/dL    Total Protein 8.3 (H) 6.4 - 8.2 g/dL    Albumin 4.5 3.4 - 5.0 g/dL    Albumin/Globulin Ratio 1.2 1.1 - 2.2    Total Bilirubin 0.3 0.0 - 1.0 mg/dL    Alkaline Phosphatase 70 40 - 129 U/L    ALT 20 10 - 40 U/L    AST 23 15 - 37 U/L   Lipase   Result Value Ref Range    Lipase 19.0 13.0 - 60.0 U/L   Troponin   Result Value Ref Range    Troponin <0.01 <0.01 ng/mL   EKG 12 Lead   Result Value Ref Range    Ventricular Rate 42 BPM    Atrial Rate 42 BPM    P-R Interval 136 ms    QRS Duration 96 ms    Q-T Interval 466 ms    QTc Calculation (Bazett) 389 ms    P Axis 66 degrees    R Axis -45 degrees    T Axis 69 degrees    Diagnosis       Marked sinus bradycardiaLeft anterior fascicular blockAbnormal ECGWhen compared with ECG of 12-MAR-2019 01:26,Sinus rhythm has replaced Atrial fibrillationVent.  rate has decreased BY  45 BPMLeft anterior fascicular block is now PresentST now depressed in Inferior leads         ECG  The Ekg interpreted by me shows  Sinus bradycardia with a rate of 42 bpm. Left axis deviation. Left anterior fascicular block. No acute injury pattern. , QRS 96, QTc 389. RADIOLOGY  CT ABDOMEN PELVIS W IV CONTRAST Additional Contrast? None   Final Result   1. Gaseous small and large bowel, with mild dilation of the jejunum and   gradual transition. This is a nonspecific pattern, mild ileus possibly. Enteritis is another consideration. Obstruction appears unlikely. Continued   surveillance recommended. 2. Mild prominence of stool in the rectum. 3. Cholelithiasis. 4. Mild enlargement of prostate gland. 5. Bronchial wall thickening and ground-glass opacities in the lingula and   left lower lobe, nonspecific for bronchopneumonia or pneumonitis, such as   hypersensitivity. XR CHEST PORTABLE   Final Result   No acute cardiopulmonary abnormality. Suggestion COPD/emphysema. ED COURSE/MDM  Patient seen and evaluated. Old records reviewed. Labs and imaging reviewed and results discussed with patient. Work-up obtained. Patient is COVID-positive. Due to his abdominal tenderness CT abdomen pelvis was also obtained. The rest of his lab work is reassuring. He is treated with IV fluid, ketorolac, ondansetron with improvement in his symptoms. CT abdomen pelvis is suggestive of enteritis, which is consistent with his COVID diagnosis in the symptoms. Ambulatory pulse ox performed in the ED and shows no desaturation. Discussed results and findings with the patient and his wife. He is advised on ibuprofen, Tylenol, Zofran use. Given return precautions for severe worsening shortness of breath, nausea and vomiting not improved with Zofran, or other concerning symptoms. Follow-up to PCP in 3 to 5 days if not improving. Is this patient to be included in the SEP-1 Core Measure due to severe sepsis or septic shock?    No   Exclusion criteria - the patient is NOT to be included for SEP-1 Core Measure due to:  Viral etiology found or highly suspected (including COVID-19) without concomitant bacterial infection    During the patient's ED course, the patient was given:  Medications   0.9 % sodium chloride bolus (0 mLs IntraVENous Stopped 12/3/22 0700)   ketorolac (TORADOL) injection 15 mg (15 mg IntraVENous Given 12/3/22 0512)   ondansetron (ZOFRAN) injection 4 mg (4 mg IntraVENous Given 12/3/22 0512)   iopamidol (ISOVUE-370) 76 % injection 70 mL (70 mLs IntraVENous Given 12/3/22 0544)        IDawood DO,  am the primary clinician of record. CLINICAL IMPRESSION  No diagnosis found. Blood pressure 109/64, pulse 50, temperature 98.1 °F (36.7 °C), temperature source Oral, resp. rate 20, height 6' 1\" (1.854 m), weight 155 lb (70.3 kg), SpO2 91 %. DISPOSITION  Charlotte Villarreal was discharged to home in stable condition. Patient was given scripts for the following medications. I counseled patient how to take these medications. New Prescriptions    ONDANSETRON (ZOFRAN-ODT) 4 MG DISINTEGRATING TABLET    Take 1 tablet by mouth 3 times daily as needed for Nausea or Vomiting       Follow-up with:  Link Matthews MD  [address]  [phone]    Call in 3 days  As needed    DISCLAIMER: This chart was created using Dragon dictation software. Efforts were made by me to ensure accuracy, however some errors may be present due to limitations of this technology and occasionally words are not transcribed correctly.         Dawood Boone DO  12/03/22 8649

## 2022-12-03 NOTE — Clinical Note
Sidney Can was seen and treated in our emergency department on 12/3/2022. COVID19 virus is suspected. Per the CDC guidelines we recommend home isolation until the following conditions are all met:    1. At least five days have passed since symptoms first appeared and/or had a close exposure,   2. After home isolation for five days, wearing a mask around others for the next five days,  3. At least 24 have passed since last fever without the use of fever-reducing medications and  4. Symptoms (eg cough, shortness of breath) have improved    If you have any questions or concerns, please don't hesitate to call.     He may return to work/school on 12/06/2022        Jorge Alberto Cartwright, DO

## 2023-03-31 ENCOUNTER — APPOINTMENT (OUTPATIENT)
Dept: GENERAL RADIOLOGY | Age: 63
DRG: 199 | End: 2023-03-31
Payer: MEDICAID

## 2023-03-31 ENCOUNTER — HOSPITAL ENCOUNTER (INPATIENT)
Age: 63
LOS: 4 days | Discharge: HOME | DRG: 199 | End: 2023-04-04
Attending: EMERGENCY MEDICINE | Admitting: INTERNAL MEDICINE
Payer: MEDICAID

## 2023-03-31 ENCOUNTER — APPOINTMENT (OUTPATIENT)
Dept: CT IMAGING | Age: 63
DRG: 199 | End: 2023-03-31
Payer: MEDICAID

## 2023-03-31 DIAGNOSIS — J93.9 PNEUMOTHORAX, RIGHT: Primary | ICD-10-CM

## 2023-03-31 DIAGNOSIS — M25.511 ACUTE PAIN OF RIGHT SHOULDER: ICD-10-CM

## 2023-03-31 PROBLEM — Z72.0 TOBACCO ABUSE: Status: ACTIVE | Noted: 2023-03-31

## 2023-03-31 PROBLEM — R06.02 SHORTNESS OF BREATH: Status: ACTIVE | Noted: 2023-03-31

## 2023-03-31 PROBLEM — J44.9 CHRONIC OBSTRUCTIVE PULMONARY DISEASE (HCC): Status: ACTIVE | Noted: 2023-03-31

## 2023-03-31 LAB
ALBUMIN SERPL-MCNC: 3.8 G/DL (ref 3.4–5)
ALBUMIN/GLOB SERPL: 1 {RATIO} (ref 1.1–2.2)
ALP SERPL-CCNC: 78 U/L (ref 40–129)
ALT SERPL-CCNC: 16 U/L (ref 10–40)
ANION GAP SERPL CALCULATED.3IONS-SCNC: 10 MMOL/L (ref 3–16)
AST SERPL-CCNC: 18 U/L (ref 15–37)
BASOPHILS # BLD: 0.1 K/UL (ref 0–0.2)
BASOPHILS NFR BLD: 0.9 %
BILIRUB SERPL-MCNC: 0.3 MG/DL (ref 0–1)
BUN SERPL-MCNC: 28 MG/DL (ref 7–20)
CALCIUM SERPL-MCNC: 9.3 MG/DL (ref 8.3–10.6)
CHLORIDE SERPL-SCNC: 101 MMOL/L (ref 99–110)
CO2 SERPL-SCNC: 21 MMOL/L (ref 21–32)
CREAT SERPL-MCNC: 0.9 MG/DL (ref 0.8–1.3)
DEPRECATED RDW RBC AUTO: 13.4 % (ref 12.4–15.4)
EOSINOPHIL # BLD: 0.2 K/UL (ref 0–0.6)
EOSINOPHIL NFR BLD: 2.9 %
FLUAV RNA RESP QL NAA+PROBE: NOT DETECTED
FLUBV RNA RESP QL NAA+PROBE: NOT DETECTED
GFR SERPLBLD CREATININE-BSD FMLA CKD-EPI: >60 ML/MIN/{1.73_M2}
GLUCOSE SERPL-MCNC: 108 MG/DL (ref 70–99)
HCT VFR BLD AUTO: 45.7 % (ref 40.5–52.5)
HGB BLD-MCNC: 15.6 G/DL (ref 13.5–17.5)
LYMPHOCYTES # BLD: 2 K/UL (ref 1–5.1)
LYMPHOCYTES NFR BLD: 26.2 %
MCH RBC QN AUTO: 32 PG (ref 26–34)
MCHC RBC AUTO-ENTMCNC: 34.2 G/DL (ref 31–36)
MCV RBC AUTO: 93.7 FL (ref 80–100)
MONOCYTES # BLD: 0.9 K/UL (ref 0–1.3)
MONOCYTES NFR BLD: 11.7 %
NEUTROPHILS # BLD: 4.4 K/UL (ref 1.7–7.7)
NEUTROPHILS NFR BLD: 58.3 %
PLATELET # BLD AUTO: 254 K/UL (ref 135–450)
PMV BLD AUTO: 9.1 FL (ref 5–10.5)
POTASSIUM SERPL-SCNC: 4.5 MMOL/L (ref 3.5–5.1)
PROT SERPL-MCNC: 7.5 G/DL (ref 6.4–8.2)
RBC # BLD AUTO: 4.88 M/UL (ref 4.2–5.9)
SARS-COV-2 RNA RESP QL NAA+PROBE: NOT DETECTED
SODIUM SERPL-SCNC: 132 MMOL/L (ref 136–145)
WBC # BLD AUTO: 7.5 K/UL (ref 4–11)

## 2023-03-31 PROCEDURE — 36415 COLL VENOUS BLD VENIPUNCTURE: CPT

## 2023-03-31 PROCEDURE — 80053 COMPREHEN METABOLIC PANEL: CPT

## 2023-03-31 PROCEDURE — 96374 THER/PROPH/DIAG INJ IV PUSH: CPT

## 2023-03-31 PROCEDURE — 93005 ELECTROCARDIOGRAM TRACING: CPT

## 2023-03-31 PROCEDURE — 2500000003 HC RX 250 WO HCPCS: Performed by: EMERGENCY MEDICINE

## 2023-03-31 PROCEDURE — 99222 1ST HOSP IP/OBS MODERATE 55: CPT | Performed by: INTERNAL MEDICINE

## 2023-03-31 PROCEDURE — 99255 IP/OBS CONSLTJ NEW/EST HI 80: CPT | Performed by: INTERNAL MEDICINE

## 2023-03-31 PROCEDURE — 6360000002 HC RX W HCPCS: Performed by: EMERGENCY MEDICINE

## 2023-03-31 PROCEDURE — 0W9930Z DRAINAGE OF RIGHT PLEURAL CAVITY WITH DRAINAGE DEVICE, PERCUTANEOUS APPROACH: ICD-10-PCS | Performed by: RADIOLOGY

## 2023-03-31 PROCEDURE — 71045 X-RAY EXAM CHEST 1 VIEW: CPT

## 2023-03-31 PROCEDURE — 32551 INSERTION OF CHEST TUBE: CPT

## 2023-03-31 PROCEDURE — 73030 X-RAY EXAM OF SHOULDER: CPT

## 2023-03-31 PROCEDURE — 71250 CT THORAX DX C-: CPT

## 2023-03-31 PROCEDURE — 71046 X-RAY EXAM CHEST 2 VIEWS: CPT

## 2023-03-31 PROCEDURE — 85025 COMPLETE CBC W/AUTO DIFF WBC: CPT

## 2023-03-31 PROCEDURE — 87636 SARSCOV2 & INF A&B AMP PRB: CPT

## 2023-03-31 PROCEDURE — 2580000003 HC RX 258: Performed by: NURSE PRACTITIONER

## 2023-03-31 PROCEDURE — 6360000002 HC RX W HCPCS: Performed by: NURSE PRACTITIONER

## 2023-03-31 PROCEDURE — 2580000003 HC RX 258: Performed by: EMERGENCY MEDICINE

## 2023-03-31 PROCEDURE — 2060000000 HC ICU INTERMEDIATE R&B

## 2023-03-31 PROCEDURE — 99285 EMERGENCY DEPT VISIT HI MDM: CPT

## 2023-03-31 RX ORDER — 0.9 % SODIUM CHLORIDE 0.9 %
1000 INTRAVENOUS SOLUTION INTRAVENOUS ONCE
Status: COMPLETED | OUTPATIENT
Start: 2023-03-31 | End: 2023-03-31

## 2023-03-31 RX ORDER — SODIUM CHLORIDE 0.9 % (FLUSH) 0.9 %
5-40 SYRINGE (ML) INJECTION EVERY 12 HOURS SCHEDULED
Status: DISCONTINUED | OUTPATIENT
Start: 2023-03-31 | End: 2023-04-04 | Stop reason: HOSPADM

## 2023-03-31 RX ORDER — LIDOCAINE HYDROCHLORIDE AND EPINEPHRINE 10; 10 MG/ML; UG/ML
20 INJECTION, SOLUTION INFILTRATION; PERINEURAL ONCE
Status: COMPLETED | OUTPATIENT
Start: 2023-03-31 | End: 2023-03-31

## 2023-03-31 RX ORDER — ENOXAPARIN SODIUM 100 MG/ML
40 INJECTION SUBCUTANEOUS DAILY
Status: DISCONTINUED | OUTPATIENT
Start: 2023-03-31 | End: 2023-04-04 | Stop reason: HOSPADM

## 2023-03-31 RX ORDER — IPRATROPIUM BROMIDE AND ALBUTEROL SULFATE 2.5; .5 MG/3ML; MG/3ML
1 SOLUTION RESPIRATORY (INHALATION) EVERY 4 HOURS PRN
Status: DISCONTINUED | OUTPATIENT
Start: 2023-03-31 | End: 2023-04-04 | Stop reason: HOSPADM

## 2023-03-31 RX ORDER — SODIUM CHLORIDE 0.9 % (FLUSH) 0.9 %
5-40 SYRINGE (ML) INJECTION PRN
Status: DISCONTINUED | OUTPATIENT
Start: 2023-03-31 | End: 2023-04-04 | Stop reason: HOSPADM

## 2023-03-31 RX ORDER — OXYCODONE HYDROCHLORIDE AND ACETAMINOPHEN 5; 325 MG/1; MG/1
1 TABLET ORAL EVERY 6 HOURS PRN
Status: DISCONTINUED | OUTPATIENT
Start: 2023-03-31 | End: 2023-04-04 | Stop reason: HOSPADM

## 2023-03-31 RX ORDER — ONDANSETRON 2 MG/ML
4 INJECTION INTRAMUSCULAR; INTRAVENOUS EVERY 6 HOURS PRN
Status: DISCONTINUED | OUTPATIENT
Start: 2023-03-31 | End: 2023-04-04 | Stop reason: HOSPADM

## 2023-03-31 RX ORDER — ONDANSETRON 4 MG/1
4 TABLET, ORALLY DISINTEGRATING ORAL EVERY 8 HOURS PRN
Status: DISCONTINUED | OUTPATIENT
Start: 2023-03-31 | End: 2023-04-04 | Stop reason: HOSPADM

## 2023-03-31 RX ORDER — IPRATROPIUM BROMIDE AND ALBUTEROL SULFATE 2.5; .5 MG/3ML; MG/3ML
1 SOLUTION RESPIRATORY (INHALATION)
Status: DISCONTINUED | OUTPATIENT
Start: 2023-03-31 | End: 2023-03-31

## 2023-03-31 RX ORDER — SODIUM CHLORIDE 9 MG/ML
INJECTION, SOLUTION INTRAVENOUS PRN
Status: DISCONTINUED | OUTPATIENT
Start: 2023-03-31 | End: 2023-04-04 | Stop reason: HOSPADM

## 2023-03-31 RX ORDER — POLYETHYLENE GLYCOL 3350 17 G/17G
17 POWDER, FOR SOLUTION ORAL DAILY PRN
Status: DISCONTINUED | OUTPATIENT
Start: 2023-03-31 | End: 2023-04-04 | Stop reason: HOSPADM

## 2023-03-31 RX ORDER — ACETAMINOPHEN 650 MG/1
650 SUPPOSITORY RECTAL EVERY 6 HOURS PRN
Status: DISCONTINUED | OUTPATIENT
Start: 2023-03-31 | End: 2023-04-04 | Stop reason: HOSPADM

## 2023-03-31 RX ORDER — ACETAMINOPHEN 325 MG/1
650 TABLET ORAL EVERY 6 HOURS PRN
Status: DISCONTINUED | OUTPATIENT
Start: 2023-03-31 | End: 2023-04-04 | Stop reason: HOSPADM

## 2023-03-31 RX ADMIN — LIDOCAINE HYDROCHLORIDE,EPINEPHRINE BITARTRATE 20 ML: 10; .01 INJECTION, SOLUTION INFILTRATION; PERINEURAL at 10:19

## 2023-03-31 RX ADMIN — HYDROMORPHONE HYDROCHLORIDE 1 MG: 1 INJECTION, SOLUTION INTRAMUSCULAR; INTRAVENOUS; SUBCUTANEOUS at 10:17

## 2023-03-31 RX ADMIN — SODIUM CHLORIDE 1000 ML: 9 INJECTION, SOLUTION INTRAVENOUS at 09:03

## 2023-03-31 RX ADMIN — Medication 10 ML: at 20:14

## 2023-03-31 RX ADMIN — ENOXAPARIN SODIUM 40 MG: 100 INJECTION SUBCUTANEOUS at 16:48

## 2023-03-31 ASSESSMENT — ENCOUNTER SYMPTOMS
VOICE CHANGE: 0
SHORTNESS OF BREATH: 0
TROUBLE SWALLOWING: 0
WHEEZING: 0

## 2023-03-31 ASSESSMENT — PAIN SCALES - GENERAL
PAINLEVEL_OUTOF10: 3
PAINLEVEL_OUTOF10: 3

## 2023-03-31 ASSESSMENT — PAIN - FUNCTIONAL ASSESSMENT: PAIN_FUNCTIONAL_ASSESSMENT: 0-10

## 2023-03-31 NOTE — ED PROVIDER NOTES
treatment plan with patient or surrogate, ordering and review of laboratory studies, discussions with consultants, ordering and review of radiographic studies, pulse oximetry, evaluation of patient's response to treatment, re-evaluation of patient's condition, examination of patient and obtaining history from patient or surrogate        FINAL IMPRESSION      1. Pneumothorax, right    2. Acute pain of right shoulder          DISPOSITION/PLAN     DISPOSITION Decision To Admit 03/31/2023 11:12:38 AM        (Please note that portions of this note were completed with a voice recognition program.  Efforts were made to edit the dictations but occasionally words are mis-transcribed. )    Mike Moss MD (electronically signed)  Attending Emergency Physician           Mike Moss MD  03/31/23 8274

## 2023-03-31 NOTE — ACP (ADVANCE CARE PLANNING)
Advance Care Planning     General Advance Care Planning (ACP) Conversation    Date of Conversation: 3/31/2023  Conducted with: Patient with Decision Making Capacity    Healthcare Decision Maker:    Primary Decision Maker: Brittni Acevedo - Domestic Partner - [phone]  Click here to complete [address] including selection of the Healthcare Decision Maker Relationship (ie \"Primary\"). Today we documented desired Decision Maker(s), who is (are) NOT Legal Next of Kin. ACP documents are required for decision maker authority.     Patient wanting to complete POA documents; Pastoral Care consult placed to assist.  Content/Action Overview:    Reviewed DNR/DNI and patient elects Full Code (Attempt Resuscitation)      Length of Voluntary ACP Conversation in minutes:  <16 minutes (Non-Billable)    Azeb Garibay RN
Discussion: Completed  New advance directive completed. Returned original document(s) to patient, as well as copies for distribution to appointed agents  Copy of advance directive given to staff to scan into medical record.     Electronically signed by Candelaria Davalos on 3/31/2023 at 1:50 PM

## 2023-03-31 NOTE — FLOWSHEET NOTE
03/31/23 1400   Vital Signs   Temp 97.8 °F (36.6 °C)   Temp Source Oral   Heart Rate 52   Heart Rate Source Monitor   Resp 25   /67   MAP (Calculated) 84   Patient Position Semi fowlers   Level of Consciousness 0   MEWS Score 2   Pain Assessment   Pain Assessment 0-10   Pain Level 3   Oxygen Therapy   SpO2 93 %   O2 Device None (Room air)   O2 Flow Rate (L/min) 0 L/min   Height and Weight   Height 6' 1\" (1.854 m)       Pt admitted to PCU from ER for R pneumothorax. R chest tube in place; hooked to LWS. Noticiable bubbling in chamber; MD notified. Pt is a/o x4. Oriented to them room and call light system. No s/s of distress. Orders released. Pt assessment complete; see flow sheets. Patient is able to demonstrate the ability to move from a reclining position to an upright position within the recliner.     Madison Torres RN

## 2023-03-31 NOTE — ED NOTES
1121- Call placed to [facility] for consult      Call was returned by Dr. Nivia Colon and spoke to Dr. Brenna Barragan  03/31/23 [address] [facility]  03/31/23 1128

## 2023-03-31 NOTE — CARE COORDINATION
Housing: Yes  Other Identified Issues/Barriers to RETURNING to current housing: None  Potential Assistance needed at discharge: N/A            Potential DME:    Patient expects to discharge to: [facility] for transportation at discharge:      Financial  Patient uninsured; Financial Counselor consulted to assist    Does insurance require precert for SNF: No    Potential assistance Purchasing Medications: Yes (Patient has no medical insurance)  Meds-to-Beds request:        [address]  [address] [place], [place] - [address] [facility] [phone] [place] [phone] [address] [address]  Phone: [phone] Fax: [phone]      Notes:    Factors facilitating achievement of predicted outcomes: Cooperative and Pleasant    Barriers to discharge: Pneumothorax; Chest tube placed in ED    Additional Case Management Notes:     CM reviewed chart and met with patient at bedside. Patient presents to ED with right should pain; Chest imaging revealed right-sided pneumothorax. Chest tube placed in ED. Currently on supplemental o2 @ 3L with no home use. SpO2 96%. Patient lives in home with SO. IPTA and +. Plans return home at discharge. Patient has no medical insurance; Financial Counselor consulted. Following for DCP needs. The Plan for Transition of Care is related to the following treatment goals of Pneumothorax [N03.5]    IF APPLICABLE: The Patient and/or patient representative Ness Wade and his family were provided with a choice of provider and agrees with the discharge plan. Freedom of choice list with basic dialogue that supports the patient's individualized plan of care/goals and shares the quality data associated with the providers was provided to:     Patient Representative Name:       The Patient and/or Patient Representative Agree with the Discharge Plan?       Marla Rodriguez RN  Case Management Department  Ph: [phone]      03/31/23 1143   Service

## 2023-03-31 NOTE — H&P
for rash   Neurological: Negative for syncope   Hematological: Negative for adenopathy   Psychiatric/Behavorial: Negative for anxiety    PHYSICAL EXAM:    /71   Pulse 53   Temp 97.9 °F (36.6 °C) (Oral)   Resp 13   Wt 155 lb (70.3 kg)   SpO2 95%   BMI 20.45 kg/m²     Gen: No distress. Alert. +Pleasant chronically ill appearing male   Eyes: PERRL. No sclera icterus. No conjunctival injection. Neck: No JVD. No Carotid Bruit. Trachea midline. Resp: No accessory muscle use. No crackles. No wheezes. No rhonchi. +Diminished breath sounds right sided with chest tube in place   CV: +Bradycardic rate. Regular rhythm. No murmur. No rub. No edema. Peripheral Pulses: +2 palpable, equal bilaterally   GI: Non-tender. Non-distended. No masses. No organomegaly. Normal bowel sounds. No hernia. Skin: Warm and dry. No nodule on exposed extremities. No rash on exposed extremities. M/S: No cyanosis. No joint deformity. No clubbing. +tenderness to right shoulder joint with some limitation in active ROM 2/2 to pain   Neuro: Awake. Grossly nonfocal    Psych: Oriented x 3. No anxiety or agitation. Kaleb Peralta MD have reviewed the chart on Benitez Scott and personally interviewed and examined patient, reviewed the data (labs and imaging) and after discussion with my PA formulated the plan. Agree with note with the following edits. HPI: 72-year-old male with a history of tobacco use presented emergency room with right shoulder pain of 2 days duration. 2 weeks ago he had right-sided chest pain along with shortness of breath which apparently got better but his shoulder pain got worse which prompted him to come to the emergency room. Denies any cough or fever. I reviewed the patient's Past Medical History, Past Surgical History, Medications, and Allergies.      Physical exam:    /67   Pulse 52   Temp 97.8 °F (36.6 °C) (Oral)   Resp 25   Ht 6' 1\" (1.854 m)   Wt 155 lb (70.3 kg)

## 2023-03-31 NOTE — CONSULTS
place  Residual small right basilar pneumothorax  Minimal subcutaneous emphysema    ASSESSMENT:  Spontaneous secondary pneumothorax-clot ruptured bullae  COPD  Shortness of breath and chest pain  Asymptomatic bradycardia  80-pack-year history of smoking    PLAN:  Supplemental oxygen to maintain SaO2 >92%; wean as tolerated  Inhaled bronchodilators  Chest tube -20 suction  Daily chest x-ray  Pain control  Will need CT chest to evaluate pleural parenchymal  Might need pleurodesis if persistent air leak

## 2023-04-01 ENCOUNTER — APPOINTMENT (OUTPATIENT)
Dept: GENERAL RADIOLOGY | Age: 63
DRG: 199 | End: 2023-04-01
Payer: MEDICAID

## 2023-04-01 LAB
EKG ATRIAL RATE: 60 BPM
EKG DIAGNOSIS: NORMAL
EKG P AXIS: 77 DEGREES
EKG P-R INTERVAL: 154 MS
EKG Q-T INTERVAL: 398 MS
EKG QRS DURATION: 100 MS
EKG QTC CALCULATION (BAZETT): 398 MS
EKG R AXIS: 10 DEGREES
EKG T AXIS: 77 DEGREES
EKG VENTRICULAR RATE: 60 BPM

## 2023-04-01 PROCEDURE — 99232 SBSQ HOSP IP/OBS MODERATE 35: CPT | Performed by: INTERNAL MEDICINE

## 2023-04-01 PROCEDURE — 2060000000 HC ICU INTERMEDIATE R&B

## 2023-04-01 PROCEDURE — 6370000000 HC RX 637 (ALT 250 FOR IP): Performed by: INTERNAL MEDICINE

## 2023-04-01 PROCEDURE — 93010 ELECTROCARDIOGRAM REPORT: CPT | Performed by: INTERNAL MEDICINE

## 2023-04-01 PROCEDURE — 6370000000 HC RX 637 (ALT 250 FOR IP): Performed by: NURSE PRACTITIONER

## 2023-04-01 PROCEDURE — 94640 AIRWAY INHALATION TREATMENT: CPT

## 2023-04-01 PROCEDURE — 94761 N-INVAS EAR/PLS OXIMETRY MLT: CPT

## 2023-04-01 PROCEDURE — 6360000002 HC RX W HCPCS: Performed by: NURSE PRACTITIONER

## 2023-04-01 PROCEDURE — 99233 SBSQ HOSP IP/OBS HIGH 50: CPT | Performed by: INTERNAL MEDICINE

## 2023-04-01 PROCEDURE — 2580000003 HC RX 258: Performed by: NURSE PRACTITIONER

## 2023-04-01 PROCEDURE — 71045 X-RAY EXAM CHEST 1 VIEW: CPT

## 2023-04-01 RX ORDER — IPRATROPIUM BROMIDE AND ALBUTEROL SULFATE 2.5; .5 MG/3ML; MG/3ML
1 SOLUTION RESPIRATORY (INHALATION) 2 TIMES DAILY
Status: DISCONTINUED | OUTPATIENT
Start: 2023-04-01 | End: 2023-04-04 | Stop reason: HOSPADM

## 2023-04-01 RX ORDER — DOXYCYCLINE HYCLATE 100 MG
100 TABLET ORAL EVERY 12 HOURS SCHEDULED
Status: DISCONTINUED | OUTPATIENT
Start: 2023-04-01 | End: 2023-04-04 | Stop reason: HOSPADM

## 2023-04-01 RX ORDER — GUAIFENESIN 600 MG/1
600 TABLET, EXTENDED RELEASE ORAL 2 TIMES DAILY
Status: DISCONTINUED | OUTPATIENT
Start: 2023-04-01 | End: 2023-04-04 | Stop reason: HOSPADM

## 2023-04-01 RX ORDER — IPRATROPIUM BROMIDE AND ALBUTEROL SULFATE 2.5; .5 MG/3ML; MG/3ML
1 SOLUTION RESPIRATORY (INHALATION)
Status: DISCONTINUED | OUTPATIENT
Start: 2023-04-01 | End: 2023-04-01

## 2023-04-01 RX ADMIN — Medication 10 ML: at 09:00

## 2023-04-01 RX ADMIN — GUAIFENESIN 600 MG: 600 TABLET, EXTENDED RELEASE ORAL at 13:04

## 2023-04-01 RX ADMIN — ACETAMINOPHEN 650 MG: 325 TABLET ORAL at 08:59

## 2023-04-01 RX ADMIN — ENOXAPARIN SODIUM 40 MG: 100 INJECTION SUBCUTANEOUS at 09:00

## 2023-04-01 RX ADMIN — Medication 10 ML: at 21:27

## 2023-04-01 RX ADMIN — DOXYCYCLINE HYCLATE 100 MG: 100 TABLET, COATED ORAL at 21:26

## 2023-04-01 RX ADMIN — IPRATROPIUM BROMIDE AND ALBUTEROL SULFATE 1 AMPULE: 2.5; .5 SOLUTION RESPIRATORY (INHALATION) at 15:32

## 2023-04-01 RX ADMIN — IPRATROPIUM BROMIDE AND ALBUTEROL SULFATE 1 AMPULE: 2.5; .5 SOLUTION RESPIRATORY (INHALATION) at 20:22

## 2023-04-01 RX ADMIN — GUAIFENESIN 600 MG: 600 TABLET, EXTENDED RELEASE ORAL at 21:26

## 2023-04-01 ASSESSMENT — PAIN SCALES - GENERAL
PAINLEVEL_OUTOF10: 5
PAINLEVEL_OUTOF10: 0

## 2023-04-01 ASSESSMENT — PAIN DESCRIPTION - ORIENTATION: ORIENTATION: RIGHT

## 2023-04-01 ASSESSMENT — PAIN DESCRIPTION - FREQUENCY: FREQUENCY: CONTINUOUS

## 2023-04-01 ASSESSMENT — PAIN DESCRIPTION - LOCATION: LOCATION: RIB CAGE

## 2023-04-01 ASSESSMENT — PAIN - FUNCTIONAL ASSESSMENT: PAIN_FUNCTIONAL_ASSESSMENT: ACTIVITIES ARE NOT PREVENTED

## 2023-04-01 ASSESSMENT — PAIN DESCRIPTION - PAIN TYPE: TYPE: ACUTE PAIN

## 2023-04-01 ASSESSMENT — PAIN DESCRIPTION - DESCRIPTORS: DESCRIPTORS: ACHING;DISCOMFORT

## 2023-04-01 NOTE — FLOWSHEET NOTE
04/01/23 0845   Vital Signs   Temp 97.7 °F (36.5 °C)   Temp Source Oral   Heart Rate 65   Heart Rate Source Monitor   Resp 18   /67   MAP (Calculated) 87   Patient Position Semi fowlers   Level of Consciousness 0   MEWS Score 1   Pain Assessment   Pain Assessment 0-10   Pain Level 5   Pain Location Rib cage   Pain Orientation Right   Pain Descriptors Aching;Discomfort   Pain Type Acute pain   Pain Frequency Continuous   Oxygen Therapy   SpO2 91 %   O2 Device None (Room air)   O2 Flow Rate (L/min) 0 L/min       Pt assessment complete; see flow sheets. Vitals completed. Chest tube still has intermittent bubbling in chamber. Dr. Susan Chávez at bedside to look at it; it has improved since yesterday. No new orders. CXR completed this morning; waiting on results. PRN tylenol given for pain. Meds given per MAR. Pt denies any other care needs at this time. Pt stable.     John Root RN

## 2023-04-01 NOTE — FLOWSHEET NOTE
04/01/23 1300   Vital Signs   Temp 97.5 °F (36.4 °C)   Temp Source Oral   Heart Rate 60   Heart Rate Source Monitor   Resp 17   /67   MAP (Calculated) 86   Patient Position Semi fowlers   Level of Consciousness 0   MEWS Score 1   Pain Assessment   Pain Assessment 0-10   Pain Level 0   Oxygen Therapy   SpO2 95 %   O2 Device None (Room air)   O2 Flow Rate (L/min) 0 L/min     Afternoon vitals completed. Pt stable at this time.     Ismael Perry RN

## 2023-04-02 ENCOUNTER — APPOINTMENT (OUTPATIENT)
Dept: GENERAL RADIOLOGY | Age: 63
DRG: 199 | End: 2023-04-02
Payer: MEDICAID

## 2023-04-02 PROCEDURE — 2060000000 HC ICU INTERMEDIATE R&B

## 2023-04-02 PROCEDURE — 6370000000 HC RX 637 (ALT 250 FOR IP): Performed by: INTERNAL MEDICINE

## 2023-04-02 PROCEDURE — 71045 X-RAY EXAM CHEST 1 VIEW: CPT

## 2023-04-02 PROCEDURE — 94640 AIRWAY INHALATION TREATMENT: CPT

## 2023-04-02 PROCEDURE — 99232 SBSQ HOSP IP/OBS MODERATE 35: CPT | Performed by: INTERNAL MEDICINE

## 2023-04-02 PROCEDURE — 94761 N-INVAS EAR/PLS OXIMETRY MLT: CPT

## 2023-04-02 PROCEDURE — 99233 SBSQ HOSP IP/OBS HIGH 50: CPT | Performed by: INTERNAL MEDICINE

## 2023-04-02 PROCEDURE — 2580000003 HC RX 258: Performed by: NURSE PRACTITIONER

## 2023-04-02 PROCEDURE — 6360000002 HC RX W HCPCS: Performed by: NURSE PRACTITIONER

## 2023-04-02 RX ADMIN — ENOXAPARIN SODIUM 40 MG: 100 INJECTION SUBCUTANEOUS at 08:35

## 2023-04-02 RX ADMIN — IPRATROPIUM BROMIDE AND ALBUTEROL SULFATE 1 AMPULE: 2.5; .5 SOLUTION RESPIRATORY (INHALATION) at 20:58

## 2023-04-02 RX ADMIN — DOXYCYCLINE HYCLATE 100 MG: 100 TABLET, COATED ORAL at 08:35

## 2023-04-02 RX ADMIN — Medication 10 ML: at 08:35

## 2023-04-02 RX ADMIN — GUAIFENESIN 600 MG: 600 TABLET, EXTENDED RELEASE ORAL at 21:42

## 2023-04-02 RX ADMIN — GUAIFENESIN 600 MG: 600 TABLET, EXTENDED RELEASE ORAL at 08:35

## 2023-04-02 RX ADMIN — DOXYCYCLINE HYCLATE 100 MG: 100 TABLET, COATED ORAL at 21:42

## 2023-04-02 RX ADMIN — Medication 10 ML: at 21:42

## 2023-04-02 RX ADMIN — IPRATROPIUM BROMIDE AND ALBUTEROL SULFATE 1 AMPULE: 2.5; .5 SOLUTION RESPIRATORY (INHALATION) at 07:53

## 2023-04-02 ASSESSMENT — PAIN SCALES - GENERAL
PAINLEVEL_OUTOF10: 0
PAINLEVEL_OUTOF10: 0

## 2023-04-02 NOTE — FLOWSHEET NOTE
04/02/23 1300   Vital Signs   Temp 97.3 °F (36.3 °C)   Temp Source Oral   Heart Rate 68   Heart Rate Source Monitor   Resp 18   /69   MAP (Calculated) 82   BP Location Left upper arm   Patient Position Semi fowlers   Level of Consciousness 0   MEWS Score 1   Pain Assessment   Pain Assessment 0-10   Pain Level 0   Oxygen Therapy   SpO2 93 %   O2 Device None (Room air)   O2 Flow Rate (L/min) 0 L/min       Afternoon vitals completed. Pt stable.     Kary Malcolm RN

## 2023-04-02 NOTE — FLOWSHEET NOTE
04/02/23 0830   Vital Signs   Temp 97.6 °F (36.4 °C)   Temp Source Oral   Heart Rate 69   Heart Rate Source Monitor   Resp 19   /70   MAP (Calculated) 85   Patient Position Semi fowlers   Level of Consciousness 0   MEWS Score 1   Pain Assessment   Pain Assessment 0-10   Pain Level 0   Patient's Stated Pain Goal 0 - No pain   Oxygen Therapy   SpO2 91 %   O2 Device None (Room air)   O2 Flow Rate (L/min) 0 L/min         Pt assessment complete; see flow sheets. Vitals completed. No s/s of distress. Breath sounds noted on the R side. No bubbling in chest tube chamber. MD aware. Meds given per MAR. Pt stable.     Madison Torres RN

## 2023-04-02 NOTE — FLOWSHEET NOTE
04/01/23 1905   Vital Signs   Temp 97.4 °F (36.3 °C)   Temp Source Oral   Heart Rate 63   Heart Rate Source Monitor   Resp 18   /71   MAP (Calculated) 86   BP Location Left upper arm   BP Method Automatic   Patient Position Semi fowlers   Level of Consciousness 1   MEWS Score 2   Oxygen Therapy   SpO2 94 %   O2 Device None (Room air)   Pt assessment complete. Pt sitting up in bed playing video games. Lung sound clear on left. Diminished on the right. Chest tube in place -30 continuous suction. Small amount of crepitus noted and marked with marker. Nightly medications given. Denies needs at this time. Call light within reach.

## 2023-04-03 ENCOUNTER — APPOINTMENT (OUTPATIENT)
Dept: GENERAL RADIOLOGY | Age: 63
DRG: 199 | End: 2023-04-03
Payer: MEDICAID

## 2023-04-03 PROCEDURE — 99232 SBSQ HOSP IP/OBS MODERATE 35: CPT | Performed by: INTERNAL MEDICINE

## 2023-04-03 PROCEDURE — 94761 N-INVAS EAR/PLS OXIMETRY MLT: CPT

## 2023-04-03 PROCEDURE — 99233 SBSQ HOSP IP/OBS HIGH 50: CPT | Performed by: INTERNAL MEDICINE

## 2023-04-03 PROCEDURE — 71045 X-RAY EXAM CHEST 1 VIEW: CPT

## 2023-04-03 PROCEDURE — 2060000000 HC ICU INTERMEDIATE R&B

## 2023-04-03 PROCEDURE — 94640 AIRWAY INHALATION TREATMENT: CPT

## 2023-04-03 PROCEDURE — 6370000000 HC RX 637 (ALT 250 FOR IP)

## 2023-04-03 PROCEDURE — 2580000003 HC RX 258: Performed by: NURSE PRACTITIONER

## 2023-04-03 PROCEDURE — 99406 BEHAV CHNG SMOKING 3-10 MIN: CPT | Performed by: INTERNAL MEDICINE

## 2023-04-03 PROCEDURE — 6370000000 HC RX 637 (ALT 250 FOR IP): Performed by: INTERNAL MEDICINE

## 2023-04-03 PROCEDURE — 6360000002 HC RX W HCPCS: Performed by: NURSE PRACTITIONER

## 2023-04-03 RX ORDER — BISACODYL 5 MG/1
5 TABLET, DELAYED RELEASE ORAL ONCE
Status: COMPLETED | OUTPATIENT
Start: 2023-04-03 | End: 2023-04-03

## 2023-04-03 RX ADMIN — IPRATROPIUM BROMIDE AND ALBUTEROL SULFATE 1 AMPULE: 2.5; .5 SOLUTION RESPIRATORY (INHALATION) at 20:41

## 2023-04-03 RX ADMIN — ENOXAPARIN SODIUM 40 MG: 100 INJECTION SUBCUTANEOUS at 09:08

## 2023-04-03 RX ADMIN — DOXYCYCLINE HYCLATE 100 MG: 100 TABLET, COATED ORAL at 19:41

## 2023-04-03 RX ADMIN — IPRATROPIUM BROMIDE AND ALBUTEROL SULFATE 1 AMPULE: 2.5; .5 SOLUTION RESPIRATORY (INHALATION) at 08:20

## 2023-04-03 RX ADMIN — GUAIFENESIN 600 MG: 600 TABLET, EXTENDED RELEASE ORAL at 09:08

## 2023-04-03 RX ADMIN — DOXYCYCLINE HYCLATE 100 MG: 100 TABLET, COATED ORAL at 09:08

## 2023-04-03 RX ADMIN — Medication 10 ML: at 19:43

## 2023-04-03 RX ADMIN — BISACODYL 5 MG: 5 TABLET, COATED ORAL at 11:39

## 2023-04-03 RX ADMIN — GUAIFENESIN 600 MG: 600 TABLET, EXTENDED RELEASE ORAL at 19:41

## 2023-04-03 RX ADMIN — Medication 10 ML: at 09:09

## 2023-04-03 NOTE — CARE COORDINATION
INTERDISCIPLINARY PLAN OF CARE CONFERENCE    Date/Time: 4/3/2023 1:08 PM  Completed by: Kristin Maradiaga RN, Case Management      Patient Name:  Rafaela Fong  YOB: 1960  Admitting Diagnosis: Pneumothorax [J93.9]  Pneumothorax, right [J93.9]  Acute pain of right shoulder [M25.511]     Admit Date/Time:  3/31/2023  7:16 AM    Chart reviewed. Interdisciplinary team contacted or reviewed plan related to patient progress and discharge plans. Disciplines included Case Management, Nursing, and Dietitian. Current Status:Inpatient 03/31/2023  PT/OT recommendation for discharge plan of care: n/a     Expected D/C Disposition:  Home  Confirmed plan with patient and/or family Yes confirmed with: Patient at bedside    Discharge Plan Comments: Reviewed chart and met with patient at bedside to discuss discharge plan. Patient lives in home with SO; Plans return. Patient is uninsured; states he did meet with Financial Counselor. Spoke to [place], [place], and [place], regarding potential sami for discharge medications.      Home O2 in place on admit: No  Pt informed of need to bring portable home O2 tank on day of discharge for nursing to connect prior to leaving:  Not Indicated  Verbalized agreement/Understanding:  Not Indicated

## 2023-04-03 NOTE — FLOWSHEET NOTE
04/02/23 2118   Vital Signs   Temp 98 °F (36.7 °C)   Temp Source Oral   Heart Rate 69   Heart Rate Source Monitor   Resp 18   /71   MAP (Calculated) 90   BP Location Right upper arm   BP Method Automatic   Patient Position High fowlers   Level of Consciousness 0   MEWS Score 1   Pain Assessment   Pain Assessment None - Denies Pain   Opioid-Induced Sedation   POSS Score 1   RASS   Carrington Agitation Sedation Scale (RASS) 0   Oxygen Therapy   SpO2 90 %   O2 Device None (Room air)

## 2023-04-03 NOTE — FLOWSHEET NOTE
04/03/23 0800   Vital Signs   Temp 97.6 °F (36.4 °C)   Temp Source Oral   Heart Rate 67   Heart Rate Source Monitor   Resp 18   /75   MAP (Calculated) 90   BP Location Right upper arm   BP Method Automatic   Patient Position High fowlers   Level of Consciousness 0   MEWS Score 1   Oxygen Therapy   SpO2 95 %   O2 Device None (Room air)     Pt. Resting in bed. Call light in reach. Shift assessment completed see flow sheet. Denies any needs at this time. Will continue to monitor.

## 2023-04-03 NOTE — FLOWSHEET NOTE
04/03/23 1930   Vital Signs   Temp 98.1 °F (36.7 °C)   Temp Source Oral   Heart Rate 66   Heart Rate Source Monitor   Resp 18   /69   MAP (Calculated) 86   BP Location Left upper arm   BP Method Automatic   Patient Position Semi fowlers   Level of Consciousness 0   MEWS Score 1   Pain Assessment   Pain Assessment None - Denies Pain   Care Plan - Pain Goals   Verbalizes/displays adequate comfort level or baseline comfort level Encourage patient to monitor pain and request assistance   Opioid-Induced Sedation   POSS Score 1   RASS   Carrington Agitation Sedation Scale (RASS) 0   Oxygen Therapy   SpO2 95 %   O2 Device None (Room air)

## 2023-04-04 ENCOUNTER — APPOINTMENT (OUTPATIENT)
Dept: GENERAL RADIOLOGY | Age: 63
DRG: 199 | End: 2023-04-04
Payer: MEDICAID

## 2023-04-04 VITALS
WEIGHT: 127.1 LBS | RESPIRATION RATE: 16 BRPM | DIASTOLIC BLOOD PRESSURE: 69 MMHG | HEIGHT: 73 IN | SYSTOLIC BLOOD PRESSURE: 117 MMHG | HEART RATE: 63 BPM | TEMPERATURE: 97.6 F | OXYGEN SATURATION: 96 % | BODY MASS INDEX: 16.84 KG/M2

## 2023-04-04 PROCEDURE — 94761 N-INVAS EAR/PLS OXIMETRY MLT: CPT

## 2023-04-04 PROCEDURE — 94640 AIRWAY INHALATION TREATMENT: CPT

## 2023-04-04 PROCEDURE — 99233 SBSQ HOSP IP/OBS HIGH 50: CPT | Performed by: INTERNAL MEDICINE

## 2023-04-04 PROCEDURE — 71045 X-RAY EXAM CHEST 1 VIEW: CPT

## 2023-04-04 PROCEDURE — 6370000000 HC RX 637 (ALT 250 FOR IP): Performed by: INTERNAL MEDICINE

## 2023-04-04 PROCEDURE — 6360000002 HC RX W HCPCS: Performed by: NURSE PRACTITIONER

## 2023-04-04 PROCEDURE — 2580000003 HC RX 258: Performed by: NURSE PRACTITIONER

## 2023-04-04 RX ADMIN — Medication 10 ML: at 08:22

## 2023-04-04 RX ADMIN — IPRATROPIUM BROMIDE AND ALBUTEROL SULFATE 1 AMPULE: 2.5; .5 SOLUTION RESPIRATORY (INHALATION) at 08:25

## 2023-04-04 RX ADMIN — ENOXAPARIN SODIUM 40 MG: 100 INJECTION SUBCUTANEOUS at 08:21

## 2023-04-04 RX ADMIN — GUAIFENESIN 600 MG: 600 TABLET, EXTENDED RELEASE ORAL at 08:22

## 2023-04-04 RX ADMIN — DOXYCYCLINE HYCLATE 100 MG: 100 TABLET, COATED ORAL at 08:22

## 2023-04-04 NOTE — PLAN OF CARE
Admit PCU    Pneumothorax  Chest tube placed in ED  Pulmonary consulted for management       Clifford Virgen, APRN - CNP
Problem: Discharge Planning  Goal: Discharge to home or other facility with appropriate resources  Outcome: Progressing     Problem: Pain  Goal: Verbalizes/displays adequate comfort level or baseline comfort level  Outcome: Progressing     Problem: Safety - Adult  Goal: Free from fall injury  Outcome: Progressing     Problem: ABCDS Injury Assessment  Goal: Absence of physical injury  Outcome: Progressing     Problem: Respiratory - Adult  Goal: Achieves optimal ventilation and oxygenation  Outcome: Progressing     Problem: Cardiovascular - Adult  Goal: Maintains optimal cardiac output and hemodynamic stability  Outcome: Progressing  Flowsheets (Taken 4/2/2023 2152 by Claudette Shaffer RN)  Maintains optimal cardiac output and hemodynamic stability: Monitor blood pressure and heart rate  Goal: Absence of cardiac dysrhythmias or at baseline  Outcome: Progressing     Problem: Skin/Tissue Integrity - Adult  Goal: Skin integrity remains intact  Outcome: Progressing  Flowsheets (Taken 4/2/2023 2156 by Claudette Shaffer RN)  Skin Integrity Remains Intact: Monitor for areas of redness and/or skin breakdown  Goal: Incisions, wounds, or drain sites healing without S/S of infection  Outcome: Progressing  Goal: Oral mucous membranes remain intact  Outcome: Progressing     Problem: Infection - Adult  Goal: Absence of infection at discharge  Outcome: Progressing  Goal: Absence of infection during hospitalization  Outcome: Progressing  Goal: Absence of fever/infection during anticipated neutropenic period  Outcome: Progressing     Problem: Metabolic/Fluid and Electrolytes - Adult  Goal: Electrolytes maintained within normal limits  Outcome: Progressing  Goal: Hemodynamic stability and optimal renal function maintained  Outcome: Progressing  Goal: Glucose maintained within prescribed range  Outcome: Progressing
Problem: Discharge Planning  Goal: Discharge to home or other facility with appropriate resources  Outcome: Progressing     Problem: Pain  Goal: Verbalizes/displays adequate comfort level or baseline comfort level  Outcome: Progressing     Problem: Safety - Adult  Goal: Free from fall injury  Outcome: Progressing     Problem: ABCDS Injury Assessment  Goal: Absence of physical injury  Outcome: Progressing     Problem: Respiratory - Adult  Goal: Achieves optimal ventilation and oxygenation  Outcome: Progressing     Problem: Cardiovascular - Adult  Goal: Maintains optimal cardiac output and hemodynamic stability  Outcome: Progressing  Goal: Absence of cardiac dysrhythmias or at baseline  Outcome: Progressing     Problem: Skin/Tissue Integrity - Adult  Goal: Skin integrity remains intact  Outcome: Progressing  Goal: Incisions, wounds, or drain sites healing without S/S of infection  Outcome: Progressing  Goal: Oral mucous membranes remain intact  Outcome: Progressing     Problem: Musculoskeletal - Adult  Goal: Return mobility to safest level of function  Outcome: Progressing  Goal: Maintain proper alignment of affected body part  Outcome: Progressing  Goal: Return ADL status to a safe level of function  Outcome: Progressing     Problem: Infection - Adult  Goal: Absence of infection at discharge  Outcome: Progressing  Goal: Absence of infection during hospitalization  Outcome: Progressing  Goal: Absence of fever/infection during anticipated neutropenic period  Outcome: Progressing     Problem: Metabolic/Fluid and Electrolytes - Adult  Goal: Electrolytes maintained within normal limits  Outcome: Progressing  Goal: Hemodynamic stability and optimal renal function maintained  Outcome: Progressing  Goal: Glucose maintained within prescribed range  Outcome: Progressing     Problem: Hematologic - Adult  Goal: Maintains hematologic stability  Outcome: Progressing
Problem: Discharge Planning  Goal: Discharge to home or other facility with appropriate resources  Outcome: Progressing     Problem: Pain  Goal: Verbalizes/displays adequate comfort level or baseline comfort level  Outcome: Progressing  Flowsheets (Taken 4/3/2023 1930 by Adilene Mercado, RN)  Verbalizes/displays adequate comfort level or baseline comfort level: Encourage patient to monitor pain and request assistance     Problem: Safety - Adult  Goal: Free from fall injury  Outcome: Progressing     Problem: ABCDS Injury Assessment  Goal: Absence of physical injury  Outcome: Progressing     Problem: Respiratory - Adult  Goal: Achieves optimal ventilation and oxygenation  Outcome: Progressing     Problem: Cardiovascular - Adult  Goal: Maintains optimal cardiac output and hemodynamic stability  Outcome: Progressing  Goal: Absence of cardiac dysrhythmias or at baseline  Outcome: Progressing     Problem: Skin/Tissue Integrity - Adult  Goal: Skin integrity remains intact  Outcome: Progressing  Flowsheets (Taken 4/3/2023 1948 by Adilene Mercado RN)  Skin Integrity Remains Intact: Monitor for areas of redness and/or skin breakdown  Goal: Incisions, wounds, or drain sites healing without S/S of infection  Outcome: Progressing  Goal: Oral mucous membranes remain intact  Outcome: Progressing     Problem: Musculoskeletal - Adult  Goal: Return mobility to safest level of function  Outcome: Progressing  Goal: Maintain proper alignment of affected body part  Outcome: Progressing  Goal: Return ADL status to a safe level of function  Outcome: Progressing     Problem: Infection - Adult  Goal: Absence of infection at discharge  Outcome: Progressing  Goal: Absence of infection during hospitalization  Outcome: Progressing  Goal: Absence of fever/infection during anticipated neutropenic period  Outcome: Progressing     Problem: Metabolic/Fluid and Electrolytes - Adult  Goal: Electrolytes maintained within normal limits  Outcome:
Intact: Monitor for areas of redness and/or skin breakdown  Goal: Incisions, wounds, or drain sites healing without S/S of infection  4/4/2023 1556 by Chan Obrien RN  Outcome: Completed  4/4/2023 0819 by Chan Obrien RN  Outcome: Progressing  Goal: Oral mucous membranes remain intact  4/4/2023 1556 by Chan Obrien RN  Outcome: Completed  4/4/2023 0819 by Chan Obrien RN  Outcome: Progressing     Problem: Musculoskeletal - Adult  Goal: Return mobility to safest level of function  4/4/2023 1556 by Chan Obrien RN  Outcome: Completed  4/4/2023 0819 by Chan Obrien RN  Outcome: Progressing  Goal: Maintain proper alignment of affected body part  4/4/2023 1556 by Chan Obrien RN  Outcome: Completed  4/4/2023 0819 by Chan Obrien RN  Outcome: Progressing  Goal: Return ADL status to a safe level of function  4/4/2023 1556 by Chan Obrien RN  Outcome: Completed  4/4/2023 0819 by Chan Obrien RN  Outcome: Progressing     Problem: Infection - Adult  Goal: Absence of infection at discharge  4/4/2023 1556 by Chan Obrien RN  Outcome: Completed  4/4/2023 0819 by Chan Obrien RN  Outcome: Progressing  Goal: Absence of infection during hospitalization  4/4/2023 1556 by Chan Obrien RN  Outcome: Completed  4/4/2023 0819 by Chan Obrien RN  Outcome: Progressing  Goal: Absence of fever/infection during anticipated neutropenic period  4/4/2023 1556 by Chan Obrien RN  Outcome: Completed  4/4/2023 0819 by Chan Obrien RN  Outcome: Progressing     Problem: Metabolic/Fluid and Electrolytes - Adult  Goal: Electrolytes maintained within normal limits  4/4/2023 1556 by Chan Obrien RN  Outcome: Completed  4/4/2023 0819 by Chan Obrien RN  Outcome: Progressing  Goal: Hemodynamic stability and optimal renal function maintained  4/4/2023 1556 by Chan Obrien RN  Outcome: Completed  4/4/2023 0819 by Chan Obrien RN  Outcome:

## 2023-04-04 NOTE — DISCHARGE SUMMARY
Increased dependent left basilar   atelectasis. CT CHEST WO CONTRAST   Final Result   Moderate right-sided pneumothorax is seen. Chest tube seen in place on the   right      Patchy consolidation of the right lung base, either atelectasis or pneumonia      Mild to moderate underlying emphysema      RECOMMENDATIONS:   Unavailable         XR CHEST PORTABLE   Final Result   The subcutaneous emphysema is worsening. The right pneumothorax is   unchanged. However, the lung apex is incompletely included. XR CHEST PORTABLE   Final Result   Placement of pigtail chest tube coiled in the right upper hemithorax. Residual small right basilar pneumothorax measuring 1.2 cm of pleural   separation compared to 3 cm on the comparison study. XR CHEST (2 VW)   Final Result   Moderate right-sided pneumothorax with no evidence of mediastinal   shift/tension. A spontaneous pneumothorax may be considered given underlying   pattern of COPD and lack of known injury. Current study confirms findings previously communicated to the emergency room   clinician at 7:48 a.m. Yue Leavitt XR SHOULDER RIGHT (MIN 2 VIEWS)   Final Result   1. Degenerative change in the right shoulder with no acute abnormality. Soft   tissue ossification along the rotator interval may indicate a previous   rotator cuff injury. 2. Partial visualization of probable right pneumothorax. A follow-up chest   radiograph is recommended for further evaluation. Findings were discussed with ERIN GILLETTE at 7:48 am on 3/31/2023. Discharge Medications     Medication List        STOP taking these medications      ondansetron 4 MG disintegrating tablet  Commonly known as: ZOFRAN-ODT                Discharged in stable condition to home     Follow Up:   Follow up with PCP in 1 week    ***

## 2023-04-04 NOTE — FLOWSHEET NOTE
04/04/23 0725   Vital Signs   Temp 97.2 °F (36.2 °C)   Temp Source Oral   Heart Rate 71   Heart Rate Source Monitor   Resp 16   /70   MAP (Calculated) 80   BP Location Left upper arm   BP Method Automatic   Level of Consciousness 0   MEWS Score 2   Oxygen Therapy   SpO2 95 %   O2 Device None (Room air)     Pt. Resting in bed. Call light in reach. Shift assessment completed see flow sheet. Denies any needs at this time. Will continue to monitor.

## 2023-04-04 NOTE — DISCHARGE INSTRUCTIONS
F/w pulmonary in 2 weeks  No smoking      [facility]  [phone]  [facility] [place]  [address]  CALL FOR DETAILS:  SERVICES OFFERED:  Emergency Home Energy Assistance Program: winter Heating program  [address] [facility] and [address] [place] [address].  [address]  Phone [phone]

## 2023-04-05 NOTE — PROGRESS NOTES
03/31/23 1353   Encounter Summary   Encounter Overview/Reason  Advance Care Planning   Service Provided For: Patient   Referral/Consult From: Multi-disciplinary team   Support System Significant other   Last Encounter  03/31/23  ( assisted pt to complete HCPOA)   Complexity of Encounter Low   Begin Time 1315   End Time  1353   Total Time Calculated 38 min   Advance Care Planning   Type ACP conversation; Completed AD/ACP document(s)   Assessment/Intervention/Outcome   Assessment Calm   Intervention Active listening;Prayer (assurance of)/[place]   Outcome Expressed Gratitude   Plan and Referrals   Plan/Referrals No future visits requested
04/01/23 2022   RT Protocol   History Pulmonary Disease 1   Respiratory pattern 0   Breath sounds 2   Cough 0   Indications for Bronchodilator Therapy Decreased or absent breath sounds   Bronchodilator Assessment Score 3   RT Inhaler-Nebulizer Bronchodilator Protocol Note    There is a bronchodilator order in the chart from a provider indicating to follow the RT Bronchodilator Protocol and there is an Initiate RT Inhaler-Nebulizer Bronchodilator Protocol order as well (see protocol at bottom of note). CXR Findings:  XR CHEST PORTABLE    Result Date: 4/1/2023  Persistent small right-sided pneumothorax. Increased dependent left basilar atelectasis. XR CHEST PORTABLE    Result Date: 3/31/2023  The subcutaneous emphysema is worsening. The right pneumothorax is unchanged. However, the lung apex is incompletely included. XR CHEST PORTABLE    Result Date: 3/31/2023  Placement of pigtail chest tube coiled in the right upper hemithorax. Residual small right basilar pneumothorax measuring 1.2 cm of pleural separation compared to 3 cm on the comparison study. The findings from the last RT Protocol Assessment were as follows:   History Pulmonary Disease: Smoker 15 pack years or more  Respiratory Pattern: Regular pattern and RR 12-20 bpm  Breath Sounds: Slightly diminished and/or crackles  Cough: Strong, spontaneous, non-productive  Indication for Bronchodilator Therapy: Decreased or absent breath sounds  Bronchodilator Assessment Score: 3    Aerosolized bronchodilator medication orders have been revised according to the RT Inhaler-Nebulizer Bronchodilator Protocol below. Respiratory Therapist to perform RT Therapy Protocol Assessment initially then follow the protocol. Repeat RT Therapy Protocol Assessment PRN for score 0-3 or on second treatment, BID, and PRN for scores above 3.     No Indications - adjust the frequency to every 6 hours PRN wheezing or bronchospasm, if no treatments needed after 48
Bedside handoff per Kimberly Philippe RN. Patient resting quietly with respirations WNL. Right chest tube continues with air leak. Dr. Nain Zuniga aware per Kimberly Philippe RN. Continuous pulse oximetry intact. Patient denies the need for pain medicine at this time. Instructed to call prn. Call in easy reach. Continue to monitor closely.   Alisia Go RN
Bedside report and transfer of care given to [facility]. Pt currently resting in bed with the call light within reach. Pt denies any other care needs at this time. Pt stable at this time.       Jd Francis RN
Bedside report and transfer of care given to [facility]. Pt currently resting in bed with the call light within reach. Pt denies any other care needs at this time. Pt stable at this time.     Shruthi Nichols RN
Bedside report and transfer of care given to [facility]. Pt currently resting in bed with the call light within reach. Pt denies any other care needs at this time. Pt stable at this time.     Pipe Aguilar RN
Call out to pulmonology for chest tube.      Renee Clarke RN
Chest tube placed by MD. Patient tolerated procedure well. [facility].
Consult has been called to Dr. Kole Mitchell on 3/31/23.  Spoke with Usman Mckinley. 2:12 PM    Divina Puentes  3/31/2023
Dr. Elsa Kwok at bedside to check out chest tube. Order for stat CXR. Chest tube reinforced with MD. Family at bedside; updated on care plan.     Pipe Aguilar RN
Dr. Khalida Gaona gave this RN a verbal order to increase the chest tube canister to -30 from -20. Done so at this time.     Tana Rodriguez RN
EOS report given to Jeanna Donaldson RN. Care transferred at this time.
Financial counselor spoke Sen Gomes) with Patient in the ED
Nursing handoff to Krystal Garibay, [facility].   Sandeep Colon, RN
Patient educated on discharge instructions as well as new medications use, dosage, administration and possible side effects. Patient verified knowledge. IV removed without difficulty and dry dressing in place. Telemetry monitor removed and returned to [facility]. Pt left facility in stable condition to Home with all of their personal belongings.
Patient is in bed, alert and oriented to person, place, time, and situation, call light is within reach. All night medications given. Chest tube to gravity, water seal and no output. Shift assessment and vitals are complete. Patient has no complaints at this time. Will continue to monitor.    Victoria Sadler RN
Patient is in bed, alert and oriented to person, place, time, and situation, call light is within reach. All night medications given. Shift assessment and vitals are complete. Patient has no complaints at this time. Will continue to monitor.    Doreen High RN
Patient is resting comfortably. Call light is with in reach. Patient is breathing is unlabored. Will continue to monitor.    Victoria Sadler RN
Physician Progress Note      PATIENT:               Griselda Pleasure  CSN #:                  [id number]  :                       1960  ADMIT DATE:       3/31/2023 7:16 AM  DISCH DATE:        2023 5:05 PM  RESPONDING  PROVIDER #:        Laurel Younger MD          QUERY TEXT:    Patient admitted with Spontaneous secondary pneumothorax, noted to have RLL   airspace disease -atelectasis cannot exclude pneumonia. If possible, please   further specify if after study: The medical record reflects the following:  Risk Factors: pneumothorax, copd, hx of smoking, advanced age  Clinical Indicators: RLL airspace disease -atelectasis cannot exclude   pneumonia  Treatment: Doxycycline day #, imaging    Thank you,  Ramya Dubois RN,BSN,CCDS,CRCR  Options provided:  -- Suspected pneumonia was treated this admission  -- Pneumonia ruled out  -- Other - I will add my own diagnosis  -- Disagree - Not applicable / Not valid  -- Disagree - Clinically unable to determine / Unknown  -- Refer to Clinical Documentation Reviewer    PROVIDER RESPONSE TEXT:    Suspected pneumonia was treated this admission.     Query created by: Kenisha Vang on 2023 1:26 PM      Electronically signed by:  Laurel Younger MD 2023 2:31 PM
Progress Note    Admit Date:  3/31/2023    Right pneumothorax -s/p chest tube placement     Subjective:  Mr. Osmin Chaudhry today is feeling better, pain is improved. Still having productive cough     Objective:   Patient Vitals for the past 4 hrs:   BP Temp Temp src Pulse Resp SpO2   04/03/23 1130 107/65 97.8 °F (36.6 °C) Oral 76 18 96 %          Intake/Output Summary (Last 24 hours) at 4/3/2023 1447  Last data filed at 4/3/2023 0814  Gross per 24 hour   Intake --   Output 650 ml   Net -650 ml       Physical Exam:    Gen: No distress. Alert. +Chronically ill appearing   Eyes: PERRL. No sclera icterus. No conjunctival injection. Neck: No JVD. Trachea midline. Resp: No accessory muscle use. No crackles. No wheezes. No rhonchi. ++Right sided chest tube, diminished breath sounds   CV: Regular rate. Regular rhythm. No murmur. No rub. No edema. Peripheral Pulses: +2 palpable, equal bilaterally   GI: Non-tender. Non-distended. Normal bowel sounds. Skin: Warm and dry. No nodule on exposed extremities. No rash on exposed extremities. M/S: No cyanosis. No joint deformity. No clubbing. Neuro: Awake. Grossly nonfocal    Psych: Oriented x 3. No anxiety or agitation. Nicolas Loredo MD have reviewed the chart on Erika Tierney and personally interviewed and examined patient, reviewed the data (labs and imaging) and after discussion with my PA formulated the plan. Agree with note with the following edits. HPI:     I reviewed the patient's Past Medical History, Past Surgical History, Medications, and Allergies. No sob , remains on RA        General:  middle aged male, thin appearing  Awake, alert and oriented.  Appears to be not in any distress  Mucous Membranes:  Pink , anicteric  Neck: No JVD, no carotid bruit, no thyromegaly  Chest:  Clear to auscultation bilaterally, no added sounds  Right sided chest tube in place, no subcut emphysema   Cardiovascular:  RRR S1S2 heard, no murmurs or gallops  Abdomen:
Progress Note    Admit Date:  3/31/2023    Subjective:  Mr. Marguerite Villatoro is feeling better. He has a right chest tube. Pain controlled. Air leak is improved    Objective:   /69   Pulse 68   Temp 97.3 °F (36.3 °C) (Oral)   Resp 18   Ht 6' 1\" (1.854 m)   Wt 127 lb 11.2 oz (57.9 kg) Comment: last recorded weight was a stated weight of 155 lbs. SpO2 93%   BMI 16.85 kg/m²        Intake/Output Summary (Last 24 hours) at 4/2/2023 1355  Last data filed at 4/2/2023 1300  Gross per 24 hour   Intake 236 ml   Output 955 ml   Net -719 ml         Physical Exam:    General appearance: alert, appears stated age and cooperative  Head: Normocephalic, without obvious abnormality, atraumatic  Eyes: conjunctivae/corneas clear. PERRL, EOM's intact. Neck: no adenopathy, no carotid bruit, no JVD, supple, symmetrical, trachea midline and thyroid not enlarged, symmetric, no tenderness/mass/nodules  Lungs: right sided chest tube. Diminished BS. No wheezes.   Heart: regular rate and rhythm, S1, S2 normal, no murmur, click, rub or gallop  Abdomen: soft, non-tender; bowel sounds normal; no masses,  no organomegaly  Extremities: extremities normal, atraumatic, no cyanosis or edema  Pulses: 2+ and symmetric  Skin: Skin color, texture, turgor normal. No rashes or lesions  Neurologic: Grossly normal    Scheduled Meds:   doxycycline hyclate  100 mg Oral 2 times per day    guaiFENesin  600 mg Oral BID    ipratropium-albuterol  1 ampule Inhalation BID    sodium chloride flush  5-40 mL IntraVENous 2 times per day    enoxaparin  40 mg SubCUTAneous Daily       Continuous Infusions:   sodium chloride         PRN Meds:  sodium chloride flush, sodium chloride, ondansetron **OR** ondansetron, polyethylene glycol, acetaminophen **OR** acetaminophen, oxyCODONE-acetaminophen, ipratropium-albuterol      Data:  CBC:   Recent Labs     03/31/23  0832   WBC 7.5   HGB 15.6   HCT 45.7   MCV 93.7          BMP:   Recent Labs     03/31/23  0832   *
Progress Note    Admit Date:  3/31/2023    Subjective:  Mr. Mila Yougn is feeling better. He has a right chest tube. Pain controlled. Objective:   /67   Pulse 65   Temp 97.7 °F (36.5 °C) (Oral)   Resp 18   Ht 6' 1\" (1.854 m)   Wt 155 lb (70.3 kg)   SpO2 91%   BMI 20.45 kg/m²        Intake/Output Summary (Last 24 hours) at 4/1/2023 1149  Last data filed at 4/1/2023 1024  Gross per 24 hour   Intake 483 ml   Output 933 ml   Net -450 ml       Physical Exam:    General appearance: alert, appears stated age and cooperative  Head: Normocephalic, without obvious abnormality, atraumatic  Eyes: conjunctivae/corneas clear. PERRL, EOM's intact. Neck: no adenopathy, no carotid bruit, no JVD, supple, symmetrical, trachea midline and thyroid not enlarged, symmetric, no tenderness/mass/nodules  Lungs: right sided chest tube. Diminished BS. No wheezes.   Heart: regular rate and rhythm, S1, S2 normal, no murmur, click, rub or gallop  Abdomen: soft, non-tender; bowel sounds normal; no masses,  no organomegaly  Extremities: extremities normal, atraumatic, no cyanosis or edema  Pulses: 2+ and symmetric  Skin: Skin color, texture, turgor normal. No rashes or lesions  Neurologic: Grossly normal    Scheduled Meds:   sodium chloride flush  5-40 mL IntraVENous 2 times per day    enoxaparin  40 mg SubCUTAneous Daily       Continuous Infusions:   sodium chloride         PRN Meds:  sodium chloride flush, sodium chloride, ondansetron **OR** ondansetron, polyethylene glycol, acetaminophen **OR** acetaminophen, oxyCODONE-acetaminophen, ipratropium-albuterol      Data:  CBC:   Recent Labs     03/31/23  0832   WBC 7.5   HGB 15.6   HCT 45.7   MCV 93.7        BMP:   Recent Labs     03/31/23  0832   *   K 4.5      CO2 21   BUN 28*   CREATININE 0.9     LIVER PROFILE:   Recent Labs     03/31/23  0832   AST 18   ALT 16   BILITOT 0.3   ALKPHOS 78       Cultures:     COVID 19 neg    XR CHEST PORTABLE   Final Result
Pt is lying in bed with their eyes closed. Respirations are easy and even. Call light within reach bed in lowest position with the wheels locked. Will continue to monitor.  Isaiah Bundy RN
Pt refusing breathing treatments at this time. States he does not like using any medication .
Pulmonary Progress Note    CC: Pneumothorax    Subjective:   Chest tube in place with no air leak today  Decreased suction to -20      Intake/Output Summary (Last 24 hours) at 4/2/2023 0809  Last data filed at 4/2/2023 0718  Gross per 24 hour   Intake 363 ml   Output 968 ml   Net -605 ml       Exam:   /68   Pulse 72   Temp 97.5 °F (36.4 °C) (Oral)   Resp 18   Ht 6' 1\" (1.854 m)   Wt 127 lb 11.2 oz (57.9 kg) Comment: last recorded weight was a stated weight of 155 lbs. SpO2 93%   BMI 16.85 kg/m²  on room air  Gen: No distress. Ill appearing  Eyes: PERRL. No sclera icterus. No conjunctival injection. ENT: No discharge. Pharynx clear. Neck: Trachea midline. No obvious mass. Resp: No accessory muscle use. No crackles. Few wheezes. No rhonchi. No dullness on percussion. Chest tube in place with no airleak. No significant subcutaneous emphysema  CV: Regular rate. Regular rhythm. No murmur or rub. No edema. GI: Non-tender. Non-distended. No hernia. Skin: Warm and dry. No nodule on exposed extremities. Lymph: No cervical LAD. No supraclavicular LAD. M/S: No cyanosis. No joint deformity. No clubbing. Neuro: Awake. Alert. Moves all four extremities. Psych: Oriented x 3.  No anxiety    Scheduled Meds:   doxycycline hyclate  100 mg Oral 2 times per day    guaiFENesin  600 mg Oral BID    ipratropium-albuterol  1 ampule Inhalation BID    sodium chloride flush  5-40 mL IntraVENous 2 times per day    enoxaparin  40 mg SubCUTAneous Daily     Continuous Infusions:   sodium chloride       PRN Meds:  sodium chloride flush, sodium chloride, ondansetron **OR** ondansetron, polyethylene glycol, acetaminophen **OR** acetaminophen, oxyCODONE-acetaminophen, ipratropium-albuterol    Labs:  CBC:   Recent Labs     03/31/23  0832   WBC 7.5   HGB 15.6   HCT 45.7   MCV 93.7        BMP:   Recent Labs     03/31/23  0832   *   K 4.5      CO2 21   BUN 28*   CREATININE 0.9     LIVER PROFILE:
Pulmonary Progress Note    CC: Pneumothorax    Subjective:   Chest tube in place with no air leak today  Persistent small ptx      Intake/Output Summary (Last 24 hours) at 4/3/2023 1625  Last data filed at 4/3/2023 1514  Gross per 24 hour   Intake --   Output 775 ml   Net -775 ml         Exam:   /68   Pulse 69   Temp 97.7 °F (36.5 °C) (Oral)   Resp 18   Ht 6' 1\" (1.854 m)   Wt 133 lb 9.6 oz (60.6 kg)   SpO2 94%   BMI 17.63 kg/m²  on room air  Gen: No distress. Ill appearing  Eyes: PERRL. No sclera icterus. No conjunctival injection. ENT: No discharge. Pharynx clear. Neck: Trachea midline. No obvious mass. Resp: No accessory muscle use. No crackles. Few wheezes. No rhonchi. No dullness on percussion. Chest tube in place with no airleak. No significant subcutaneous emphysema  CV: Regular rate. Regular rhythm. No murmur or rub. No edema. GI: Non-tender. Non-distended. No hernia. Skin: Warm and dry. No nodule on exposed extremities. Lymph: No cervical LAD. No supraclavicular LAD. M/S: No cyanosis. No joint deformity. No clubbing. Neuro: Awake. Alert. Moves all four extremities. Psych: Oriented x 3. No anxiety    Scheduled Meds:   doxycycline hyclate  100 mg Oral 2 times per day    guaiFENesin  600 mg Oral BID    ipratropium-albuterol  1 ampule Inhalation BID    sodium chloride flush  5-40 mL IntraVENous 2 times per day    enoxaparin  40 mg SubCUTAneous Daily     Continuous Infusions:   sodium chloride       PRN Meds:  sodium chloride flush, sodium chloride, ondansetron **OR** ondansetron, polyethylene glycol, acetaminophen **OR** acetaminophen, oxyCODONE-acetaminophen, ipratropium-albuterol    Labs:  CBC:   No results for input(s): WBC, HGB, HCT, MCV, PLT in the last 72 hours. BMP:   No results for input(s): NA, K, CL, CO2, PHOS, BUN, CREATININE, CA in the last 72 hours.     LIVER PROFILE:   No results for input(s): AST, ALT, LIPASE, BILIDIR, BILITOT, ALKPHOS in the last 72
Pulmonary Progress Note    CC: Pneumothorax    Subjective:   Chest tube in place with no air leak today  Resolved small ptx    Exam:   /70   Pulse 71   Temp 97.2 °F (36.2 °C) (Oral)   Resp 16   Ht 6' 1\" (1.854 m)   Wt 127 lb 1.6 oz (57.7 kg)   SpO2 93%   BMI 16.77 kg/m²  on room air  Gen: No distress. Ill appearing  Eyes: PERRL. No sclera icterus. No conjunctival injection. ENT: No discharge. Pharynx clear. Neck: Trachea midline. No obvious mass. Resp: No accessory muscle use. No crackles. Few wheezes. No rhonchi. CV: Regular rate. Regular rhythm. No murmur or rub. No edema. Neuro: Awake. Alert. Moves all four extremities. Psych: Oriented x 3. No anxiety    Scheduled Meds:   doxycycline hyclate  100 mg Oral 2 times per day    guaiFENesin  600 mg Oral BID    ipratropium-albuterol  1 ampule Inhalation BID    sodium chloride flush  5-40 mL IntraVENous 2 times per day    enoxaparin  40 mg SubCUTAneous Daily     Continuous Infusions:   sodium chloride       PRN Meds:  sodium chloride flush, sodium chloride, ondansetron **OR** ondansetron, polyethylene glycol, acetaminophen **OR** acetaminophen, oxyCODONE-acetaminophen, ipratropium-albuterol    Labs:  CBC:   No results for input(s): WBC, HGB, HCT, MCV, PLT in the last 72 hours. BMP:   No results for input(s): NA, K, CL, CO2, PHOS, BUN, CREATININE, CA in the last 72 hours. LIVER PROFILE:   No results for input(s): AST, ALT, LIPASE, BILIDIR, BILITOT, ALKPHOS in the last 72 hours. Invalid input(s): AMYLASE,  ALB    PT/INR: No results for input(s): PROTIME, INR in the last 72 hours. APTT: No results for input(s): APTT in the last 72 hours. UA:No results for input(s): NITRITE, COLORU, PHUR, LABCAST, WBCUA, RBCUA, MUCUS, TRICHOMONAS, YEAST, BACTERIA, CLARITYU, SPECGRAV, LEUKOCYTESUR, UROBILINOGEN, BILIRUBINUR, BLOODU, GLUCOSEU, AMORPHOUS in the last 72 hours.     Invalid input(s): KETONESU  No results for input(s): PHART, NIH0CEV, PO2ART
RT Inhaler-Nebulizer Bronchodilator Protocol Note    There is a bronchodilator order in the chart from a provider indicating to follow the RT Bronchodilator Protocol and there is an Initiate RT Inhaler-Nebulizer Bronchodilator Protocol order as well (see protocol at bottom of note). CXR Findings:  XR CHEST PORTABLE    Result Date: 3/31/2023  The subcutaneous emphysema is worsening. The right pneumothorax is unchanged. However, the lung apex is incompletely included. XR CHEST PORTABLE    Result Date: 3/31/2023  Placement of pigtail chest tube coiled in the right upper hemithorax. Residual small right basilar pneumothorax measuring 1.2 cm of pleural separation compared to 3 cm on the comparison study. The findings from the last RT Protocol Assessment were as follows:   History Pulmonary Disease: Smoker 15 pack years or more  Respiratory Pattern: Regular pattern and RR 12-20 bpm  Breath Sounds: Slightly diminished and/or crackles  Cough: Strong, spontaneous, non-productive  Indication for Bronchodilator Therapy: Decreased or absent breath sounds  Bronchodilator Assessment Score: 3    Aerosolized bronchodilator medication orders have been revised according to the RT Inhaler-Nebulizer Bronchodilator Protocol below. Respiratory Therapist to perform RT Therapy Protocol Assessment initially then follow the protocol. Repeat RT Therapy Protocol Assessment PRN for score 0-3 or on second treatment, BID, and PRN for scores above 3. No Indications - adjust the frequency to every 6 hours PRN wheezing or bronchospasm, if no treatments needed after 48 hours then discontinue using Per Protocol order mode. If indication present, adjust the RT bronchodilator orders based on the Bronchodilator Assessment Score as indicated below.   Use Inhaler orders unless patient has one or more of the following: on home nebulizer, not able to hold breath for 10 seconds, is not alert and oriented, cannot activate and use MDI
RT Inhaler-Nebulizer Bronchodilator Protocol Note    There is a bronchodilator order in the chart from a provider indicating to follow the RT Bronchodilator Protocol and there is an Initiate RT Inhaler-Nebulizer Bronchodilator Protocol order as well (see protocol at bottom of note). CXR Findings:  XR CHEST PORTABLE    Result Date: 4/3/2023  Stable, tiny right apical pneumothorax with a vertical length of 5.7 mm. Mild atelectasis lung bases. XR CHEST PORTABLE    Result Date: 4/2/2023  Residual right apical pneumothorax, which measures 5.6 mm vertically as compared to previous measurement of 57.8 mm. Minimal right basilar atelectasis. COPD changes in the lungs. Normal cardiac size and pulmonary vascularity. Stable position of right pigtail chest tube. The findings from the last RT Protocol Assessment were as follows:   History Pulmonary Disease: (P) Chronic pulmonary disease  Respiratory Pattern: (P) Regular pattern and RR 12-20 bpm  Breath Sounds: (P) Slightly diminished and/or crackles  Cough: (P) Strong, spontaneous, non-productive  Indication for Bronchodilator Therapy: (P) Decreased or absent breath sounds  Bronchodilator Assessment Score: (P) 4    Aerosolized bronchodilator medication orders have been revised according to the RT Inhaler-Nebulizer Bronchodilator Protocol below. Respiratory Therapist to perform RT Therapy Protocol Assessment initially then follow the protocol. Repeat RT Therapy Protocol Assessment PRN for score 0-3 or on second treatment, BID, and PRN for scores above 3. No Indications - adjust the frequency to every 6 hours PRN wheezing or bronchospasm, if no treatments needed after 48 hours then discontinue using Per Protocol order mode. If indication present, adjust the RT bronchodilator orders based on the Bronchodilator Assessment Score as indicated below.   Use Inhaler orders unless patient has one or more of the following: on home nebulizer, not able to hold breath
following: on home nebulizer, not able to hold breath for 10 seconds, is not alert and oriented, cannot activate and use MDI correctly, or respiratory rate 25 breaths per minute or more, then use the equivalent nebulizer order(s) with same Frequency and PRN reasons based on the score. If a patient is on this medication at home then do not decrease Frequency below that used at home. 0-3 - enter or revise RT bronchodilator order(s) to equivalent RT Bronchodilator order with Frequency of every 4 hours PRN for wheezing or increased work of breathing using Per Protocol order mode. 4-6 - enter or revise RT Bronchodilator order(s) to two equivalent RT bronchodilator orders with one order with BID Frequency and one order with Frequency of every 4 hours PRN wheezing or increased work of breathing using Per Protocol order mode. 7-10 - enter or revise RT Bronchodilator order(s) to two equivalent RT bronchodilator orders with one order with TID Frequency and one order with Frequency of every 4 hours PRN wheezing or increased work of breathing using Per Protocol order mode. 11-13 - enter or revise RT Bronchodilator order(s) to one equivalent RT bronchodilator order with QID Frequency and an Albuterol order with Frequency of every 4 hours PRN wheezing or increased work of breathing using Per Protocol order mode. Greater than 13 - enter or revise RT Bronchodilator order(s) to one equivalent RT bronchodilator order with every 4 hours Frequency and an Albuterol order with Frequency of every 2 hours PRN wheezing or increased work of breathing using Per Protocol order mode.          Electronically signed by Elisabeth Vaughan RCP on 4/1/2023 at 3:31 PM
hours. UA:No results for input(s): NITRITE, COLORU, PHUR, LABCAST, WBCUA, RBCUA, MUCUS, TRICHOMONAS, YEAST, BACTERIA, CLARITYU, SPECGRAV, LEUKOCYTESUR, UROBILINOGEN, BILIRUBINUR, BLOODU, GLUCOSEU, AMORPHOUS in the last 72 hours. Invalid input(s): KETONESU  No results for input(s): PHART, WLE0VAW, PO2ART in the last 72 hours. Microbiology:  3/31 COVID-19 not detected       Imaging:  Chest x-ray 4/1 image reviewed by me and showed  Chest tube in place  Small right-sided pneumothorax     CT chest 3/31 imaging reviewed by me and showed  Moderate right-sided pneumothorax is seen.   Chest tube seen in place on the right Patchy consolidation of the right lung base, either atelectasis or pneumonia   Mild to moderate underlying emphysema      ASSESSMENT:  Spontaneous secondary pneumothorax-clot ruptured bullae  COPD  RLL airspace disease -atelectasis cannot exclude pneumonia  Shortness of breath and chest pain  Asymptomatic bradycardia  80-pack-year history of smoking     PLAN:  Supplemental oxygen to maintain SaO2 >92%; wean as tolerated  Inhaled bronchodilators  Doxycycline  Continue chest tube -30 suction  Daily chest x-ray  Pain control  Might need pleurodesis if persistent air leak   Discussed with nursing staff  Discussed with internal medicine
more of the following: on home nebulizer, not able to hold breath for 10 seconds, is not alert and oriented, cannot activate and use MDI correctly, or respiratory rate 25 breaths per minute or more, then use the equivalent nebulizer order(s) with same Frequency and PRN reasons based on the score. If a patient is on this medication at home then do not decrease Frequency below that used at home. 0-3 - enter or revise RT bronchodilator order(s) to equivalent RT Bronchodilator order with Frequency of every 4 hours PRN for wheezing or increased work of breathing using Per Protocol order mode. 4-6 - enter or revise RT Bronchodilator order(s) to two equivalent RT bronchodilator orders with one order with BID Frequency and one order with Frequency of every 4 hours PRN wheezing or increased work of breathing using Per Protocol order mode. 7-10 - enter or revise RT Bronchodilator order(s) to two equivalent RT bronchodilator orders with one order with TID Frequency and one order with Frequency of every 4 hours PRN wheezing or increased work of breathing using Per Protocol order mode. 11-13 - enter or revise RT Bronchodilator order(s) to one equivalent RT bronchodilator order with QID Frequency and an Albuterol order with Frequency of every 4 hours PRN wheezing or increased work of breathing using Per Protocol order mode. Greater than 13 - enter or revise RT Bronchodilator order(s) to one equivalent RT bronchodilator order with every 4 hours Frequency and an Albuterol order with Frequency of every 2 hours PRN wheezing or increased work of breathing using Per Protocol order mode.      Electronically signed by Magdalena Haskins RCP on 4/2/2023 at 7:57 AM
oriented, cannot activate and use MDI correctly, or respiratory rate 25 breaths per minute or more, then use the equivalent nebulizer order(s) with same Frequency and PRN reasons based on the score. If a patient is on this medication at home then do not decrease Frequency below that used at home. 0-3 - enter or revise RT bronchodilator order(s) to equivalent RT Bronchodilator order with Frequency of every 4 hours PRN for wheezing or increased work of breathing using Per Protocol order mode. 4-6 - enter or revise RT Bronchodilator order(s) to two equivalent RT bronchodilator orders with one order with BID Frequency and one order with Frequency of every 4 hours PRN wheezing or increased work of breathing using Per Protocol order mode. 7-10 - enter or revise RT Bronchodilator order(s) to two equivalent RT bronchodilator orders with one order with TID Frequency and one order with Frequency of every 4 hours PRN wheezing or increased work of breathing using Per Protocol order mode. 11-13 - enter or revise RT Bronchodilator order(s) to one equivalent RT bronchodilator order with QID Frequency and an Albuterol order with Frequency of every 4 hours PRN wheezing or increased work of breathing using Per Protocol order mode. Greater than 13 - enter or revise RT Bronchodilator order(s) to one equivalent RT bronchodilator order with every 4 hours Frequency and an Albuterol order with Frequency of every 2 hours PRN wheezing or increased work of breathing using Per Protocol order mode.        Electronically signed by Hector Anaya RCP on 4/2/2023 at 9:00 PM
pleural effusions. XR CHEST PORTABLE   Final Result   Stable, tiny right apical pneumothorax with a vertical length of 5.7 mm. Mild atelectasis lung bases. XR CHEST PORTABLE   Final Result   Residual right apical pneumothorax, which measures 5.6 mm vertically as   compared to previous measurement of 57.8 mm. Minimal right basilar atelectasis. COPD changes in the lungs. Normal cardiac size and pulmonary vascularity. Stable position of right pigtail chest tube. XR CHEST PORTABLE   Final Result   Persistent small right-sided pneumothorax. Increased dependent left basilar   atelectasis. CT CHEST WO CONTRAST   Final Result   Moderate right-sided pneumothorax is seen. Chest tube seen in place on the   right      Patchy consolidation of the right lung base, either atelectasis or pneumonia      Mild to moderate underlying emphysema      RECOMMENDATIONS:   Unavailable         XR CHEST PORTABLE   Final Result   The subcutaneous emphysema is worsening. The right pneumothorax is   unchanged. However, the lung apex is incompletely included. XR CHEST PORTABLE   Final Result   Placement of pigtail chest tube coiled in the right upper hemithorax. Residual small right basilar pneumothorax measuring 1.2 cm of pleural   separation compared to 3 cm on the comparison study. XR CHEST (2 VW)   Final Result   Moderate right-sided pneumothorax with no evidence of mediastinal   shift/tension. A spontaneous pneumothorax may be considered given underlying   pattern of COPD and lack of known injury. Current study confirms findings previously communicated to the emergency room   clinician at 7:48 a.m. Apoorva Cervantes XR SHOULDER RIGHT (MIN 2 VIEWS)   Final Result   1. Degenerative change in the right shoulder with no acute abnormality. Soft   tissue ossification along the rotator interval may indicate a previous   rotator cuff injury.    2. Partial visualization of
seconds, is not alert and oriented, cannot activate and use MDI correctly, or respiratory rate 25 breaths per minute or more, then use the equivalent nebulizer order(s) with same Frequency and PRN reasons based on the score. If a patient is on this medication at home then do not decrease Frequency below that used at home. 0-3 - enter or revise RT bronchodilator order(s) to equivalent RT Bronchodilator order with Frequency of every 4 hours PRN for wheezing or increased work of breathing using Per Protocol order mode. 4-6 - enter or revise RT Bronchodilator order(s) to two equivalent RT bronchodilator orders with one order with BID Frequency and one order with Frequency of every 4 hours PRN wheezing or increased work of breathing using Per Protocol order mode. 7-10 - enter or revise RT Bronchodilator order(s) to two equivalent RT bronchodilator orders with one order with TID Frequency and one order with Frequency of every 4 hours PRN wheezing or increased work of breathing using Per Protocol order mode. 11-13 - enter or revise RT Bronchodilator order(s) to one equivalent RT bronchodilator order with QID Frequency and an Albuterol order with Frequency of every 4 hours PRN wheezing or increased work of breathing using Per Protocol order mode. Greater than 13 - enter or revise RT Bronchodilator order(s) to one equivalent RT bronchodilator order with every 4 hours Frequency and an Albuterol order with Frequency of every 2 hours PRN wheezing or increased work of breathing using Per Protocol order mode.          Electronically signed by Alexia Fontanez RCP on 4/3/2023 at 8:23 AM

## 2023-04-25 ENCOUNTER — TELEPHONE (OUTPATIENT)
Dept: INTERNAL MEDICINE CLINIC | Age: 63
End: 2023-04-25

## 2023-05-10 ENCOUNTER — OFFICE VISIT (OUTPATIENT)
Dept: INTERNAL MEDICINE CLINIC | Age: 63
End: 2023-05-10
Payer: MEDICAID

## 2023-05-10 VITALS
SYSTOLIC BLOOD PRESSURE: 92 MMHG | DIASTOLIC BLOOD PRESSURE: 60 MMHG | HEART RATE: 58 BPM | WEIGHT: 141 LBS | OXYGEN SATURATION: 94 % | BODY MASS INDEX: 18.69 KG/M2 | HEIGHT: 73 IN

## 2023-05-10 DIAGNOSIS — I95.2 HYPOTENSION DUE TO DRUGS: ICD-10-CM

## 2023-05-10 DIAGNOSIS — E44.0 MODERATE PROTEIN-CALORIE MALNUTRITION (HCC): Chronic | ICD-10-CM

## 2023-05-10 DIAGNOSIS — J43.8 OTHER EMPHYSEMA (HCC): Primary | ICD-10-CM

## 2023-05-10 PROCEDURE — 99214 OFFICE O/P EST MOD 30 MIN: CPT | Performed by: INTERNAL MEDICINE

## 2023-05-13 PROBLEM — R31.29 MICROSCOPIC HEMATURIA: Status: RESOLVED | Noted: 2019-03-11 | Resolved: 2023-05-13

## 2023-05-13 PROBLEM — Z72.0 TOBACCO ABUSE: Status: RESOLVED | Noted: 2023-03-31 | Resolved: 2023-05-13

## 2023-05-13 PROBLEM — E44.0 MODERATE PROTEIN-CALORIE MALNUTRITION (HCC): Chronic | Status: ACTIVE | Noted: 2023-05-13

## 2023-05-13 PROBLEM — J93.9 PNEUMOTHORAX, RIGHT: Status: RESOLVED | Noted: 2023-03-31 | Resolved: 2023-05-13

## 2023-05-13 PROBLEM — I95.9 HYPOTENSION: Status: ACTIVE | Noted: 2023-05-13

## 2023-05-13 PROBLEM — M25.511 ACUTE PAIN OF RIGHT SHOULDER: Status: RESOLVED | Noted: 2023-03-31 | Resolved: 2023-05-13

## 2023-05-13 RX ORDER — LISINOPRIL 10 MG/1
10 TABLET ORAL DAILY
COMMUNITY

## 2023-05-13 ASSESSMENT — ENCOUNTER SYMPTOMS
FREQUENT THROAT CLEARING: 0
DIFFICULTY BREATHING: 0
SPUTUM PRODUCTION: 0
SHORTNESS OF BREATH: 0
COUGH: 0
CHEST TIGHTNESS: 0
WHEEZING: 0

## 2023-05-13 ASSESSMENT — COPD QUESTIONNAIRES: COPD: 1

## 2023-11-05 ENCOUNTER — APPOINTMENT (OUTPATIENT)
Dept: CT IMAGING | Age: 63
DRG: 384 | End: 2023-11-05
Payer: MEDICAID

## 2023-11-05 ENCOUNTER — HOSPITAL ENCOUNTER (INPATIENT)
Age: 63
LOS: 1 days | Discharge: HOME | DRG: 384 | End: 2023-11-08
Attending: EMERGENCY MEDICINE | Admitting: INTERNAL MEDICINE
Payer: MEDICAID

## 2023-11-05 DIAGNOSIS — R11.2 NAUSEA AND VOMITING, UNSPECIFIED VOMITING TYPE: ICD-10-CM

## 2023-11-05 DIAGNOSIS — K56.7 ILEUS (HCC): Primary | ICD-10-CM

## 2023-11-05 LAB
ALBUMIN SERPL-MCNC: 3.7 G/DL (ref 3.4–5)
ALBUMIN/GLOB SERPL: 1.1 {RATIO} (ref 1.1–2.2)
ALP SERPL-CCNC: 64 U/L (ref 40–129)
ALT SERPL-CCNC: 13 U/L (ref 10–40)
ANION GAP SERPL CALCULATED.3IONS-SCNC: 11 MMOL/L (ref 3–16)
AST SERPL-CCNC: 23 U/L (ref 15–37)
BASOPHILS # BLD: 0 K/UL (ref 0–0.2)
BASOPHILS NFR BLD: 0.4 %
BILIRUB SERPL-MCNC: 0.4 MG/DL (ref 0–1)
BUN SERPL-MCNC: 23 MG/DL (ref 7–20)
CALCIUM SERPL-MCNC: 9 MG/DL (ref 8.3–10.6)
CHLORIDE SERPL-SCNC: 99 MMOL/L (ref 99–110)
CO2 SERPL-SCNC: 22 MMOL/L (ref 21–32)
CREAT SERPL-MCNC: 0.8 MG/DL (ref 0.8–1.3)
DEPRECATED RDW RBC AUTO: 13.4 % (ref 12.4–15.4)
EOSINOPHIL # BLD: 0 K/UL (ref 0–0.6)
EOSINOPHIL NFR BLD: 0.1 %
FLUAV RNA RESP QL NAA+PROBE: NOT DETECTED
FLUBV RNA RESP QL NAA+PROBE: NOT DETECTED
GFR SERPLBLD CREATININE-BSD FMLA CKD-EPI: >60 ML/MIN/{1.73_M2}
GLUCOSE SERPL-MCNC: 122 MG/DL (ref 70–99)
HCT VFR BLD AUTO: 43.9 % (ref 40.5–52.5)
HGB BLD-MCNC: 14.7 G/DL (ref 13.5–17.5)
LIPASE SERPL-CCNC: 9 U/L (ref 13–60)
LYMPHOCYTES # BLD: 0.6 K/UL (ref 1–5.1)
LYMPHOCYTES NFR BLD: 7.9 %
MCH RBC QN AUTO: 31 PG (ref 26–34)
MCHC RBC AUTO-ENTMCNC: 33.4 G/DL (ref 31–36)
MCV RBC AUTO: 92.7 FL (ref 80–100)
MONOCYTES # BLD: 0.9 K/UL (ref 0–1.3)
MONOCYTES NFR BLD: 11.3 %
NEUTROPHILS # BLD: 6.5 K/UL (ref 1.7–7.7)
NEUTROPHILS NFR BLD: 80.3 %
PLATELET # BLD AUTO: 183 K/UL (ref 135–450)
PMV BLD AUTO: 8.9 FL (ref 5–10.5)
POTASSIUM SERPL-SCNC: 4.8 MMOL/L (ref 3.5–5.1)
PROT SERPL-MCNC: 7.2 G/DL (ref 6.4–8.2)
RBC # BLD AUTO: 4.74 M/UL (ref 4.2–5.9)
RSV AG NOSE QL: NEGATIVE
SARS-COV-2 RNA RESP QL NAA+PROBE: NOT DETECTED
SODIUM SERPL-SCNC: 132 MMOL/L (ref 136–145)
TROPONIN, HIGH SENSITIVITY: 8 NG/L (ref 0–22)
TROPONIN, HIGH SENSITIVITY: <6 NG/L (ref 0–22)
WBC # BLD AUTO: 8.1 K/UL (ref 4–11)

## 2023-11-05 PROCEDURE — 6360000002 HC RX W HCPCS: Performed by: PHYSICIAN ASSISTANT

## 2023-11-05 PROCEDURE — 93005 ELECTROCARDIOGRAM TRACING: CPT | Performed by: PHYSICIAN ASSISTANT

## 2023-11-05 PROCEDURE — 6360000002 HC RX W HCPCS: Performed by: INTERNAL MEDICINE

## 2023-11-05 PROCEDURE — 74177 CT ABD & PELVIS W/CONTRAST: CPT

## 2023-11-05 PROCEDURE — 96375 TX/PRO/DX INJ NEW DRUG ADDON: CPT

## 2023-11-05 PROCEDURE — 99285 EMERGENCY DEPT VISIT HI MDM: CPT

## 2023-11-05 PROCEDURE — 6360000004 HC RX CONTRAST MEDICATION: Performed by: PHYSICIAN ASSISTANT

## 2023-11-05 PROCEDURE — 96374 THER/PROPH/DIAG INJ IV PUSH: CPT

## 2023-11-05 PROCEDURE — 84484 ASSAY OF TROPONIN QUANT: CPT

## 2023-11-05 PROCEDURE — G0378 HOSPITAL OBSERVATION PER HR: HCPCS

## 2023-11-05 PROCEDURE — 87636 SARSCOV2 & INF A&B AMP PRB: CPT

## 2023-11-05 PROCEDURE — 36415 COLL VENOUS BLD VENIPUNCTURE: CPT

## 2023-11-05 PROCEDURE — 87807 RSV ASSAY W/OPTIC: CPT

## 2023-11-05 PROCEDURE — 80053 COMPREHEN METABOLIC PANEL: CPT

## 2023-11-05 PROCEDURE — 85025 COMPLETE CBC W/AUTO DIFF WBC: CPT

## 2023-11-05 PROCEDURE — 83690 ASSAY OF LIPASE: CPT

## 2023-11-05 PROCEDURE — 2580000003 HC RX 258: Performed by: PHYSICIAN ASSISTANT

## 2023-11-05 RX ORDER — POTASSIUM CHLORIDE 7.45 MG/ML
10 INJECTION INTRAVENOUS PRN
Status: DISCONTINUED | OUTPATIENT
Start: 2023-11-05 | End: 2023-11-08 | Stop reason: HOSPADM

## 2023-11-05 RX ORDER — SODIUM CHLORIDE 0.9 % (FLUSH) 0.9 %
10 SYRINGE (ML) INJECTION EVERY 12 HOURS SCHEDULED
Status: DISCONTINUED | OUTPATIENT
Start: 2023-11-05 | End: 2023-11-08 | Stop reason: HOSPADM

## 2023-11-05 RX ORDER — ONDANSETRON 2 MG/ML
4 INJECTION INTRAMUSCULAR; INTRAVENOUS ONCE
Status: COMPLETED | OUTPATIENT
Start: 2023-11-05 | End: 2023-11-05

## 2023-11-05 RX ORDER — ENOXAPARIN SODIUM 100 MG/ML
40 INJECTION SUBCUTANEOUS DAILY
Status: DISCONTINUED | OUTPATIENT
Start: 2023-11-05 | End: 2023-11-08 | Stop reason: HOSPADM

## 2023-11-05 RX ORDER — ACETAMINOPHEN 650 MG/1
650 SUPPOSITORY RECTAL EVERY 6 HOURS PRN
Status: DISCONTINUED | OUTPATIENT
Start: 2023-11-05 | End: 2023-11-08 | Stop reason: HOSPADM

## 2023-11-05 RX ORDER — SODIUM CHLORIDE 0.9 % (FLUSH) 0.9 %
10 SYRINGE (ML) INJECTION PRN
Status: DISCONTINUED | OUTPATIENT
Start: 2023-11-05 | End: 2023-11-08 | Stop reason: HOSPADM

## 2023-11-05 RX ORDER — DIPHENHYDRAMINE HYDROCHLORIDE 50 MG/ML
50 INJECTION INTRAMUSCULAR; INTRAVENOUS ONCE
Status: COMPLETED | OUTPATIENT
Start: 2023-11-05 | End: 2023-11-05

## 2023-11-05 RX ORDER — NICOTINE 21 MG/24HR
1 PATCH, TRANSDERMAL 24 HOURS TRANSDERMAL DAILY
Status: DISCONTINUED | OUTPATIENT
Start: 2023-11-05 | End: 2023-11-06

## 2023-11-05 RX ORDER — PROMETHAZINE HYDROCHLORIDE 25 MG/1
12.5 TABLET ORAL EVERY 6 HOURS PRN
Status: DISCONTINUED | OUTPATIENT
Start: 2023-11-05 | End: 2023-11-08 | Stop reason: HOSPADM

## 2023-11-05 RX ORDER — 0.9 % SODIUM CHLORIDE 0.9 %
1000 INTRAVENOUS SOLUTION INTRAVENOUS ONCE
Status: COMPLETED | OUTPATIENT
Start: 2023-11-05 | End: 2023-11-05

## 2023-11-05 RX ORDER — SODIUM CHLORIDE 9 MG/ML
INJECTION, SOLUTION INTRAVENOUS PRN
Status: DISCONTINUED | OUTPATIENT
Start: 2023-11-05 | End: 2023-11-08 | Stop reason: HOSPADM

## 2023-11-05 RX ORDER — METOCLOPRAMIDE HYDROCHLORIDE 5 MG/ML
10 INJECTION INTRAMUSCULAR; INTRAVENOUS ONCE
Status: COMPLETED | OUTPATIENT
Start: 2023-11-05 | End: 2023-11-05

## 2023-11-05 RX ORDER — ACETAMINOPHEN 325 MG/1
650 TABLET ORAL EVERY 6 HOURS PRN
Status: DISCONTINUED | OUTPATIENT
Start: 2023-11-05 | End: 2023-11-08 | Stop reason: HOSPADM

## 2023-11-05 RX ORDER — ONDANSETRON 2 MG/ML
4 INJECTION INTRAMUSCULAR; INTRAVENOUS EVERY 6 HOURS PRN
Status: DISCONTINUED | OUTPATIENT
Start: 2023-11-05 | End: 2023-11-08 | Stop reason: HOSPADM

## 2023-11-05 RX ORDER — MAGNESIUM SULFATE IN WATER 40 MG/ML
2000 INJECTION, SOLUTION INTRAVENOUS PRN
Status: DISCONTINUED | OUTPATIENT
Start: 2023-11-05 | End: 2023-11-08 | Stop reason: HOSPADM

## 2023-11-05 RX ADMIN — METOCLOPRAMIDE 10 MG: 5 INJECTION, SOLUTION INTRAMUSCULAR; INTRAVENOUS at 20:20

## 2023-11-05 RX ADMIN — SODIUM CHLORIDE 1000 ML: 9 INJECTION, SOLUTION INTRAVENOUS at 20:20

## 2023-11-05 RX ADMIN — ONDANSETRON 4 MG: 2 INJECTION INTRAMUSCULAR; INTRAVENOUS at 23:25

## 2023-11-05 RX ADMIN — DIPHENHYDRAMINE HYDROCHLORIDE 50 MG: 50 INJECTION INTRAMUSCULAR; INTRAVENOUS at 20:20

## 2023-11-05 RX ADMIN — SODIUM CHLORIDE 1000 ML: 9 INJECTION, SOLUTION INTRAVENOUS at 16:20

## 2023-11-05 RX ADMIN — IOPAMIDOL 75 ML: 755 INJECTION, SOLUTION INTRAVENOUS at 17:19

## 2023-11-05 RX ADMIN — ONDANSETRON 4 MG: 2 INJECTION INTRAMUSCULAR; INTRAVENOUS at 16:20

## 2023-11-05 ASSESSMENT — PAIN - FUNCTIONAL ASSESSMENT: PAIN_FUNCTIONAL_ASSESSMENT: NONE - DENIES PAIN

## 2023-11-05 NOTE — ED PROVIDER NOTES
specified.     DISCHARGE MEDICATIONS:  New Prescriptions    No medications on file       DISCONTINUED MEDICATIONS:  Discontinued Medications    No medications on file              (Please note that portions of this note were completed with a voice recognition program.  Efforts were made to edit the dictations but occasionally words are mis-transcribed.)    Beverly Beckham PA-C (electronically signed)      Beverly Beckham PA-C  11/05/23 2029

## 2023-11-06 ENCOUNTER — APPOINTMENT (OUTPATIENT)
Dept: ULTRASOUND IMAGING | Age: 63
DRG: 384 | End: 2023-11-06
Payer: MEDICAID

## 2023-11-06 PROBLEM — K56.7 ILEUS (HCC): Status: ACTIVE | Noted: 2023-11-06

## 2023-11-06 PROBLEM — K04.7 DENTAL INFECTION: Status: ACTIVE | Noted: 2023-11-06

## 2023-11-06 LAB
ALBUMIN SERPL-MCNC: 3.2 G/DL (ref 3.4–5)
ALBUMIN/GLOB SERPL: 1 {RATIO} (ref 1.1–2.2)
ALP SERPL-CCNC: 55 U/L (ref 40–129)
ALT SERPL-CCNC: 14 U/L (ref 10–40)
ANION GAP SERPL CALCULATED.3IONS-SCNC: 8 MMOL/L (ref 3–16)
AST SERPL-CCNC: 14 U/L (ref 15–37)
BASOPHILS # BLD: 0 K/UL (ref 0–0.2)
BASOPHILS NFR BLD: 0.3 %
BILIRUB SERPL-MCNC: 0.3 MG/DL (ref 0–1)
BUN SERPL-MCNC: 21 MG/DL (ref 7–20)
CALCIUM SERPL-MCNC: 8.2 MG/DL (ref 8.3–10.6)
CHLORIDE SERPL-SCNC: 103 MMOL/L (ref 99–110)
CO2 SERPL-SCNC: 24 MMOL/L (ref 21–32)
CREAT SERPL-MCNC: 0.9 MG/DL (ref 0.8–1.3)
DEPRECATED RDW RBC AUTO: 13.6 % (ref 12.4–15.4)
EKG ATRIAL RATE: 63 BPM
EKG DIAGNOSIS: NORMAL
EKG P AXIS: 77 DEGREES
EKG P-R INTERVAL: 146 MS
EKG Q-T INTERVAL: 382 MS
EKG QRS DURATION: 100 MS
EKG QTC CALCULATION (BAZETT): 390 MS
EKG R AXIS: 13 DEGREES
EKG T AXIS: 70 DEGREES
EKG VENTRICULAR RATE: 63 BPM
EOSINOPHIL # BLD: 0 K/UL (ref 0–0.6)
EOSINOPHIL NFR BLD: 0.2 %
GFR SERPLBLD CREATININE-BSD FMLA CKD-EPI: >60 ML/MIN/{1.73_M2}
GLUCOSE BLD-MCNC: 102 MG/DL (ref 70–99)
GLUCOSE BLD-MCNC: 108 MG/DL (ref 70–99)
GLUCOSE SERPL-MCNC: 108 MG/DL (ref 70–99)
HCT VFR BLD AUTO: 39.8 % (ref 40.5–52.5)
HGB BLD-MCNC: 13.4 G/DL (ref 13.5–17.5)
LYMPHOCYTES # BLD: 0.9 K/UL (ref 1–5.1)
LYMPHOCYTES NFR BLD: 15.2 %
MCH RBC QN AUTO: 31.3 PG (ref 26–34)
MCHC RBC AUTO-ENTMCNC: 33.6 G/DL (ref 31–36)
MCV RBC AUTO: 93.3 FL (ref 80–100)
MONOCYTES # BLD: 1 K/UL (ref 0–1.3)
MONOCYTES NFR BLD: 15.7 %
NEUTROPHILS # BLD: 4.2 K/UL (ref 1.7–7.7)
NEUTROPHILS NFR BLD: 68.6 %
PERFORMED ON: ABNORMAL
PERFORMED ON: ABNORMAL
PLATELET # BLD AUTO: 177 K/UL (ref 135–450)
PMV BLD AUTO: 8.4 FL (ref 5–10.5)
POTASSIUM SERPL-SCNC: 4.3 MMOL/L (ref 3.5–5.1)
PROT SERPL-MCNC: 6.3 G/DL (ref 6.4–8.2)
RBC # BLD AUTO: 4.27 M/UL (ref 4.2–5.9)
SODIUM SERPL-SCNC: 135 MMOL/L (ref 136–145)
WBC # BLD AUTO: 6.1 K/UL (ref 4–11)

## 2023-11-06 PROCEDURE — 76705 ECHO EXAM OF ABDOMEN: CPT

## 2023-11-06 PROCEDURE — G0378 HOSPITAL OBSERVATION PER HR: HCPCS

## 2023-11-06 PROCEDURE — 85025 COMPLETE CBC W/AUTO DIFF WBC: CPT

## 2023-11-06 PROCEDURE — 6360000002 HC RX W HCPCS: Performed by: INTERNAL MEDICINE

## 2023-11-06 PROCEDURE — 93010 ELECTROCARDIOGRAM REPORT: CPT | Performed by: INTERNAL MEDICINE

## 2023-11-06 PROCEDURE — 80053 COMPREHEN METABOLIC PANEL: CPT

## 2023-11-06 PROCEDURE — 96372 THER/PROPH/DIAG INJ SC/IM: CPT

## 2023-11-06 PROCEDURE — 2580000003 HC RX 258: Performed by: INTERNAL MEDICINE

## 2023-11-06 PROCEDURE — 36415 COLL VENOUS BLD VENIPUNCTURE: CPT

## 2023-11-06 PROCEDURE — 97161 PT EVAL LOW COMPLEX 20 MIN: CPT

## 2023-11-06 PROCEDURE — 99232 SBSQ HOSP IP/OBS MODERATE 35: CPT | Performed by: INTERNAL MEDICINE

## 2023-11-06 PROCEDURE — 6370000000 HC RX 637 (ALT 250 FOR IP): Performed by: INTERNAL MEDICINE

## 2023-11-06 PROCEDURE — 94150 VITAL CAPACITY TEST: CPT

## 2023-11-06 PROCEDURE — 96375 TX/PRO/DX INJ NEW DRUG ADDON: CPT

## 2023-11-06 PROCEDURE — C9113 INJ PANTOPRAZOLE SODIUM, VIA: HCPCS | Performed by: INTERNAL MEDICINE

## 2023-11-06 RX ORDER — DOCUSATE SODIUM 100 MG/1
100 CAPSULE, LIQUID FILLED ORAL 2 TIMES DAILY
Status: DISCONTINUED | OUTPATIENT
Start: 2023-11-06 | End: 2023-11-08 | Stop reason: HOSPADM

## 2023-11-06 RX ORDER — AMOXICILLIN AND CLAVULANATE POTASSIUM 875; 125 MG/1; MG/1
1 TABLET, FILM COATED ORAL EVERY 12 HOURS SCHEDULED
Status: DISCONTINUED | OUTPATIENT
Start: 2023-11-06 | End: 2023-11-08 | Stop reason: HOSPADM

## 2023-11-06 RX ORDER — SODIUM CHLORIDE 9 MG/ML
INJECTION, SOLUTION INTRAVENOUS CONTINUOUS
Status: DISCONTINUED | OUTPATIENT
Start: 2023-11-06 | End: 2023-11-08 | Stop reason: HOSPADM

## 2023-11-06 RX ORDER — PANTOPRAZOLE SODIUM 40 MG/10ML
40 INJECTION, POWDER, LYOPHILIZED, FOR SOLUTION INTRAVENOUS DAILY
Status: DISCONTINUED | OUTPATIENT
Start: 2023-11-06 | End: 2023-11-08 | Stop reason: HOSPADM

## 2023-11-06 RX ADMIN — AMOXICILLIN AND CLAVULANATE POTASSIUM 1 TABLET: 875; 125 TABLET, FILM COATED ORAL at 12:10

## 2023-11-06 RX ADMIN — ACETAMINOPHEN 650 MG: 325 TABLET ORAL at 19:31

## 2023-11-06 RX ADMIN — AMOXICILLIN AND CLAVULANATE POTASSIUM 1 TABLET: 875; 125 TABLET, FILM COATED ORAL at 20:44

## 2023-11-06 RX ADMIN — ENOXAPARIN SODIUM 40 MG: 100 INJECTION SUBCUTANEOUS at 09:27

## 2023-11-06 RX ADMIN — Medication 10 ML: at 09:27

## 2023-11-06 RX ADMIN — DOCUSATE SODIUM 100 MG: 100 CAPSULE, LIQUID FILLED ORAL at 09:27

## 2023-11-06 RX ADMIN — SODIUM CHLORIDE: 9 INJECTION, SOLUTION INTRAVENOUS at 19:32

## 2023-11-06 RX ADMIN — PANTOPRAZOLE SODIUM 40 MG: 40 INJECTION, POWDER, FOR SOLUTION INTRAVENOUS at 09:27

## 2023-11-06 RX ADMIN — ACETAMINOPHEN 650 MG: 325 TABLET ORAL at 08:33

## 2023-11-06 RX ADMIN — DOCUSATE SODIUM 100 MG: 100 CAPSULE, LIQUID FILLED ORAL at 20:44

## 2023-11-06 RX ADMIN — SODIUM CHLORIDE: 9 INJECTION, SOLUTION INTRAVENOUS at 09:27

## 2023-11-06 ASSESSMENT — PAIN SCALES - WONG BAKER: WONGBAKER_NUMERICALRESPONSE: 0

## 2023-11-06 ASSESSMENT — PAIN DESCRIPTION - ORIENTATION: ORIENTATION: UPPER;LOWER

## 2023-11-06 ASSESSMENT — PAIN DESCRIPTION - LOCATION
LOCATION: MOUTH
LOCATION: MOUTH

## 2023-11-06 ASSESSMENT — PAIN DESCRIPTION - DESCRIPTORS
DESCRIPTORS: ACHING
DESCRIPTORS: ACHING

## 2023-11-06 ASSESSMENT — PAIN SCALES - GENERAL
PAINLEVEL_OUTOF10: 3
PAINLEVEL_OUTOF10: 0
PAINLEVEL_OUTOF10: 8

## 2023-11-06 NOTE — PROGRESS NOTES
Pt a/o. Am assessment completed see flow sheet. Tylenol 650 mg PO given for complaints of \"all teeth hurt\". [email or web address] ml/hr infusing as ordered this am. Patient resting quietly in bed at this time. Call light within reach.

## 2023-11-06 NOTE — PLAN OF CARE
Problem: Pain  Goal: Verbalizes/displays adequate comfort level or baseline comfort level  Outcome: Progressing   Patient resting quietly in bed, no further complaints. New orders noted, patient aware. Call light in reach. 3

## 2023-11-06 NOTE — PROGRESS NOTES
Bedside and Verbal shift change report given to Rosa Gooden (oncoming nurse) by Eneida Magaña (offgoing nurse).  Report included the following information SBAR, Kardex, and Cardiac Rhythm NSR .      14:43- discussed carvedilol with Dr. Aye Johnson. Patient states it was discontinued by cardiologist. Order discontinued Inpatient Physical Therapy Evaluation & Treatment    Unit: 2 [id number] [address]  Date:  11/6/2023  Patient Name:    Hernán Baron  Admitting diagnosis:  Ileus [facility]) [K56.7]  Intractable nausea and vomiting [R11.2]  Nausea and vomiting, unspecified vomiting type [R11.2]  Admit Date:  11/5/2023  Precautions/Restrictions/WB Status/ Lines/ Wounds/ Oxygen: Lines (IV) and Telemetry    Treatment Time:  10:30-10:45  Treatment Number:  1   Timed Code Treatment Minutes: 5 minutes  Total Treatment Minutes:  15  minutes    Patient Stated Goals for Therapy: \" not stated \"          Discharge Recommendations: Home independently  DME needs for discharge: Needs Met       Therapy recommendation for EMS Transport: can transport by wheelchair    Therapy recommendations for staff: Independent for ambulation with use of No AD within room  within halls    History of Present Illness:   Presented to ED 11/5 with c/c N/V. Found with ileus, likely chronic constipation as cause. GI consult pending. Home Health S4 Level Recommendation:  NA        AM-PAC Mobility Score       AM-PAC Inpatient Mobility without Stair Climbing Raw Score : 20      Subjective  Patient lying reclined in bed with no family present. Pt agreeable to this PT session. Cognition    A&O x4   Able to follow 2 step commands    Pain   No complaints    Preadmission Environment:   Pt lives with    with Significant Other  Home environment:    one story home  Steps to enter first floor:   1 steps to enter  Steps to second floor/basement: N/A  Laundry:     unknown  Bathroom:     tub/shower unit and standard height toilet  Pt sleeps in a:    Flat bed  Equipment owned:    none    Preadmission Status:  Pt able to drive: Yes  Pt fully independent with ADLs: Yes  Pt receives assistance from family for:  Independent PTA  Pt independent for functional transfers and utilized No Device for mobility in home and No Device out in community  History of falls: No  Home Health

## 2023-11-06 NOTE — ED NOTES
Spoke with ultrasound tech who stated she spoke with the attending. Was told to do ultrasound tomorrow and NPO at midnight.       Saravanan Sykes RN  11/05/23 1726

## 2023-11-06 NOTE — H&P
Hospital Medicine History & Physical      PCP: Ab Brennan MD    Date of Admission: 11/5/2023    Date of Service: Pt seen/examined on 11/5/2023    Pt seen/examined face to face on and admitted as inpatient with expected LOS to be two days but can change depending on diagnostic work up and treatment response. Chief Complaint:    Chief Complaint   Patient presents with    Emesis        History Of Present Illness:      66 y.o. male who presented to [facility] with past medical history of pneumothorax, cigarette smoking, hematuria presented to the ED with chief complaint of nausea vomiting    Patient reported that the nausea vomiting occurred with not in the stomach progressively worsening with associated chills. Patient reports that he felt similar back when he had COVID no association with dysuria diarrhea abdominal pain shortness of breath chest pain. Past Medical History:          Diagnosis Date    Acute pain of right shoulder 3/31/2023    Influenza A 03/10/2019    Microscopic hematuria 3/11/2019    NSVT (nonsustained ventricular tachycardia) (HCC)     Pneumothorax, right 3/31/2023    Tobacco abuse 3/31/2023       Past Surgical History:    Surgical History reviewed with patient and negative for recent surgies. History reviewed. No pertinent surgical history. Medications Prior to Admission:      Prior to Admission medications    Medication Sig Start Date End Date Taking? Authorizing Provider   lisinopril (PRINIVIL;ZESTRIL) 10 MG tablet Take 1 tablet by mouth daily    Provider, MD Inga       Allergies:  Patient has no known allergies. Social History:          TOBACCO:   reports that he quit smoking about 7 months ago. His smoking use included cigarettes. He has a 80.00 pack-year smoking history. He has never used smokeless tobacco.  ETOH:   reports no history of alcohol use.   E-cigarette/Vaping       Questions Responses    E-cigarette/Vaping Use Never User    Start Date 11/05/23  1615   AST 23   ALT 13   BILITOT 0.4   ALKPHOS 64     No results for input(s): \"INR\" in the last 72 hours. No results for input(s): \"CKTOTAL\", \"TROPONINI\" in the last 72 hours. Urinalysis:      Lab Results   Component Value Date/Time    NITRU Negative 03/10/2019 05:09 PM    WBCUA 0-2 03/10/2019 05:09 PM    RBCUA 5-10 03/10/2019 05:09 PM    BLOODU SMALL 03/10/2019 05:09 PM    SPECGRAV >=1.030 03/10/2019 05:09 PM    GLUCOSEU Negative 03/10/2019 05:09 PM       Radiology:     CXR: I have reviewed the CXR with the following interpretation:   atelectasis  EKG:  I have reviewed the EKG with the following interpretation:   NSR    . CT ABDOMEN PELVIS W IV CONTRAST Additional Contrast? None   Final Result   Questionable diffuse mild ileus. Tiny calcifications scattered in the pancreas which are unchanged may   represent chronic early calcific pancreatitis no active inflammation or mass   is seen. Mild constipation with no obvious obstruction. Cholelithiasis which is unchanged. Chronic liver changes which is unchanged. Bilateral renal cysts with no hydronephrosis or renal stones and no urinary   obstruction. Moderate prostatic enlargement which is unchanged. Mild bibasilar atelectasis or early infiltrates, suggest follow-up with   serial chest x-rays         US GALLBLADDER RUQ    (Results Pending)       ASSESSMENT AND PLAN:    Active Hospital Problems    Diagnosis Date Noted    Intractable nausea and vomiting [R11.2] 11/05/2023     Ileus with intractable nausea and vomiting:  Supportive care, IVF  GI consulted, appreciated    Constipation: Docusate ordered    Cholelithiasis: RUQ US    Atelectasis: Incentive spirometry    Essential Hypertension: Not taking home medication    . Due to the above diagnosis makes the patient higher risk for morbidity and mortality requiring testing and treatment      Discussion with the primary ER physician in regards to symptoms, history, physical

## 2023-11-06 NOTE — PROGRESS NOTES
New Telemetry box number assigned to Patient      To be filled out by [facility] ([place], [place])    Patient assigned to tele box number: __________________               (to be written in by [facility] ([place], [place]) when telemetry box is assigned to patient)    ___________________________________________________________________________      Bedside RN confirming that the box listed above is in fact the telemetry box number being placed on the patient listed above.         X________________________________________ RN signature        __________________________________________RN assigned to Patient (please print)    _______________ Date    ____________ Time

## 2023-11-06 NOTE — PLAN OF CARE
Problem: Discharge Planning  Goal: Discharge to home or other facility with appropriate resources  Flowsheets (Taken 11/6/2023 0132)  Discharge to home or other facility with appropriate resources:   Identify barriers to discharge with patient and caregiver   Arrange for needed discharge resources and transportation as appropriate   Identify discharge learning needs (meds, wound care, etc)     Problem: Safety - Adult  Goal: Free from fall injury  Flowsheets (Taken 11/6/2023 0132)  Free From Fall Injury: Instruct family/caregiver on patient safety

## 2023-11-06 NOTE — PROGRESS NOTES
4 Eyes Skin Assessment     NAME:  Leticia Reece  YOB: 1960  MEDICAL RECORD NUMBER:  [id number]    The patient is being assessed for  Admission    I agree that at least one RN has performed a thorough Head to Toe Skin Assessment on the patient. ALL assessment sites listed below have been assessed. Areas assessed by both nurses:    Head, Face, Ears, Shoulders, Back, Chest, Arms, Elbows, Hands, Sacrum. Buttock, Coccyx, Ischium, and Legs. Feet and Heels        Does the Patient have a Wound?  No noted wound(s)       Darío Prevention initiated by RN: No  Wound Care Orders initiated by RN: No    Pressure Injury (Stage 3,4, Unstageable, DTI, NWPT, and Complex wounds) if present, place Wound referral order by RN under : No    New Ostomies, if present place, Ostomy referral order under : No     Nurse 1 eSignature: Electronically signed by Stephany Newman RN on 11/6/23 at 3:21 AM EST    **SHARE this note so that the co-signing nurse can place an eSignature**    Nurse 2 eSignature: Electronically signed by Latoya Camacho RN on 11/6/23 at 4:50 AM EST

## 2023-11-06 NOTE — CONSULTS
Gastroenterology Consult Note    Patient:   Bob Mitchell   :    1960   Facility:   [facility]  Referring/PCP: Nataliia Childress MD  Date:     2023  Consultant:   SEBASTIAN Palomino CNP      Chief Complaint   Patient presents with    Emesis        History of Present illness   Pt. Is a  65 yo male with pmx of NSVT and pneumothorax who presented to ED  with c/o nausea and vomiting. He reports his symptoms began Friday. He reports he \"cannot keep anything down. \" He denies abdominal pain, sick contacts, fever, weight loss, diarrhea, constipation, and melena. He reports that when he does eat food, it feels like it just sits in his stomach. He reports having hemorrhoids and has occasional BRB when wiping. His last CT abd/pelvis was 2022 which revealed ileus. He has 80 pack year tobacco hx and reports quitting earlier this year. He does not use marijuana. Denies alcohol use. He does not take narcotics. He has never had a colonoscopy. Past Medical History:   Diagnosis Date    Acute pain of right shoulder 3/31/2023    Influenza A 03/10/2019    Microscopic hematuria 3/11/2019    NSVT (nonsustained ventricular tachycardia) (HCC)     Pneumothorax, right 3/31/2023    Tobacco abuse 3/31/2023     History reviewed. No pertinent surgical history. Social:   Social History     Tobacco Use    Smoking status: Former     Packs/day: 2.00     Years: 40.00     Additional pack years: 0.00     Total pack years: 80.00     Types: Cigarettes     Quit date: 3/31/2023     Years since quittin.6    Smokeless tobacco: Never   Substance Use Topics    Alcohol use: Never     Family:   Family History   Problem Relation Age of Onset    Heart Surgery Mother     High Cholesterol Mother     Heart Surgery Brother         valve     No current facility-administered medications on file prior to encounter.      Current Outpatient Medications on File Prior to Encounter   Medication Sig Dispense Refill

## 2023-11-06 NOTE — CARE COORDINATION
Type of Home Care Services None   Patient expects to be discharged to: House   Follow Up Appointment: Best Day/Time  Monday AM  (self, active )   One/Two Story Residence One story   History of falls? 0       Chart Reviewed: Yes      History Provided by:    Patient Orientation: (P) Alert and Oriented, Person, Place, Situation    Patient Cognition: (P) Alert    Hospitalization in the last 30 days (Readmission):  No    If yes, Readmission Assessment in  Navigator will be completed.     Advance Directives:      Code Status: Full Code   Patient's Primary Decision Maker is: (P) Patient Declined (Legal Next of Kin Remains as Decision Maker)    Primary Decision Maker: Marleniskyler Turpin - Domestic Partner - [phone]    Discharge Planning:    Patient lives with: (P) Spouse/Significant Other Type of Home: (P) House  Primary Care Giver: (P) Self  Patient Support Systems include: (P) Spouse/Significant Other   Current Financial resources: (P) Other (Comment) (financial aid referral, OP Pharmacy referral)  Current community resources: (P) None  Current services prior to admission: (P) None            Current DME:              Type of Home Care services:  (P) None    ADLS  Prior functional level: (P) Independent in ADLs/IADLs  Current functional level: (P) Independent in ADLs/IADLs    PT AM-PAC:   /24  OT AM-PAC:   /24    Family can provide assistance at DC: (P) Yes  Would you like Case Management to discuss the discharge plan with any other family members/significant others, and if so, who? (P) Yes  Plans to Return to Present Housing: (P) Yes  Other Identified Issues/Barriers to RETURNING to current housing: NONE  Potential Assistance needed at discharge: (P) N/A            Potential DME:    Patient expects to discharge to: (P) [address] for transportation at discharge:      Financial    Payor: /  Tripp Hernandez/DANDY referral    Does insurance require precert for SNF: No    Potential assistance Purchasing Medications: (P) Yes (uninsured)  Meds-to-Beds request: Yes      [facility] [address], [place] [phone] [address]  Phone: [phone] Fax: [phone]      Notes:    Factors facilitating achievement of predicted outcomes: Cooperative and Pleasant    Barriers to discharge: Pain, Medical complications, and financial barriers    Additional Case Management Notes: Chart reviewed. Met with pt at bedside and explained the role of the CM. IPTA. Active . Referral to financial counselor for DANDY/FA assessments. Plan: HOme. F/U w/ correction. Referral to OP Pharmacy for assistance with scripts (uninsured). CM will follow and re-assess for other needs. The Plan for Transition of Care is related to the following treatment goals of Ileus ([address]) [K56.7]  Intractable nausea and vomiting [R11.2]  Nausea and vomiting, unspecified vomiting type [J45.2]    IF APPLICABLE: The Patient and/or patient representative Bard Woodward and his family were provided with a choice of provider and agrees with the discharge plan. Freedom of choice list with basic dialogue that supports the patient's individualized plan of care/goals and shares the quality data associated with the providers was provided to:     Patient Representative Name:       The Patient and/or Patient Representative Agree with the Discharge Plan?       Caryle Miner, RN  Case Management Department  Ph: [phone]

## 2023-11-06 NOTE — PROGRESS NOTES
Patient arrived to room via wheelchair. Patient oriented to room and call light. Patient c/o nausea. No further concerns. VSS. Safety precautions in place.

## 2023-11-06 NOTE — PROGRESS NOTES
Patient resting in bed, eyes closed. No s/s of distress noted. Call light in reach. Safety precautions in place.

## 2023-11-06 NOTE — ED PROVIDER NOTES
I independently examined and evaluated Bob Mitchell. All diagnostic, treatment, and disposition decisions were made by myself in conjunction with the YAZMIN, Laraine Fabry. For all further details of the patient's emergency department visit, please see their documentation. 81-year-old male presents with 3 days of intractable nausea and vomiting. He states every time he tries to eat or drink he gets a knot in his stomach and starts vomiting. He has had some chills as well. He denies abdominal pain or back pain, no dysuria. No chest pain or shortness of breath. Vitals:    11/05/23 2132   BP: 100/60   Pulse:    Resp:    Temp:    SpO2: 91%     Here he has mild diffuse abdominal tenderness. Heart is regular rate and rhythm, no respiratory distress. Actively vomiting throughout his ER stay.       Results for orders placed or performed during the hospital encounter of 11/05/23   COVID-19 & Influenza Combo    Specimen: Nasopharyngeal Swab   Result Value Ref Range    SARS-CoV-2 RNA, RT PCR NOT DETECTED NOT DETECTED    INFLUENZA A NOT DETECTED NOT DETECTED    INFLUENZA B NOT DETECTED NOT DETECTED   Rapid RSV Antigen    Specimen: Nasopharyngeal Swab   Result Value Ref Range    RSV Rapid Ag Negative Negative   CBC with Auto Differential   Result Value Ref Range    WBC 8.1 4.0 - 11.0 K/uL    RBC 4.74 4.20 - 5.90 M/uL    Hemoglobin 14.7 13.5 - 17.5 g/dL    Hematocrit 43.9 40.5 - 52.5 %    MCV 92.7 80.0 - 100.0 fL    MCH 31.0 26.0 - 34.0 pg    MCHC 33.4 31.0 - 36.0 g/dL    RDW 13.4 12.4 - 15.4 %    Platelets 613 543 - 778 K/uL    MPV 8.9 5.0 - 10.5 fL    Neutrophils % 80.3 %    Lymphocytes % 7.9 %    Monocytes % 11.3 %    Eosinophils % 0.1 %    Basophils % 0.4 %    Neutrophils Absolute 6.5 1.7 - 7.7 K/uL    Lymphocytes Absolute 0.6 (L) 1.0 - 5.1 K/uL    Monocytes Absolute 0.9 0.0 - 1.3 K/uL    Eosinophils Absolute 0.0 0.0 - 0.6 K/uL    Basophils Absolute 0.0 0.0 - 0.2 K/uL   CMP w/ Reflex to MG   Result Value Ref Range    Sodium 132 (L) 136 - 145 mmol/L    Potassium reflex Magnesium 4.8 3.5 - 5.1 mmol/L    Chloride 99 99 - 110 mmol/L    CO2 22 21 - 32 mmol/L    Anion Gap 11 3 - 16    Glucose 122 (H) 70 - 99 mg/dL    BUN 23 (H) 7 - 20 mg/dL    Creatinine 0.8 0.8 - 1.3 mg/dL    Est, Glom Filt Rate >60 >60    Calcium 9.0 8.3 - 10.6 mg/dL    Total Protein 7.2 6.4 - 8.2 g/dL    Albumin 3.7 3.4 - 5.0 g/dL    Albumin/Globulin Ratio 1.1 1.1 - 2.2    Total Bilirubin 0.4 0.0 - 1.0 mg/dL    Alkaline Phosphatase 64 40 - 129 U/L    ALT 13 10 - 40 U/L    AST 23 15 - 37 U/L   Lipase   Result Value Ref Range    Lipase 9.0 (L) 13.0 - 60.0 U/L   Troponin   Result Value Ref Range    Troponin, High Sensitivity 8 0 - 22 ng/L   Troponin   Result Value Ref Range    Troponin, High Sensitivity <6 0 - 22 ng/L   EKG 12 Lead   Result Value Ref Range    Ventricular Rate 63 BPM    Atrial Rate 63 BPM    P-R Interval 146 ms    QRS Duration 100 ms    Q-T Interval 382 ms    QTc Calculation (Bazett) 390 ms    P Axis 77 degrees    R Axis 13 degrees    T Axis 70 degrees    Diagnosis       Sinus rhythm with Premature supraventricular complexesOtherwise normal ECGWhen compared with ECG of 31-MAR-2023 18:47,Premature supraventricular complexes are now Present       CT ABDOMEN PELVIS W IV CONTRAST Additional Contrast? None   Final Result   Questionable diffuse mild ileus. Tiny calcifications scattered in the pancreas which are unchanged may   represent chronic early calcific pancreatitis no active inflammation or mass   is seen. Mild constipation with no obvious obstruction. Cholelithiasis which is unchanged. Chronic liver changes which is unchanged. Bilateral renal cysts with no hydronephrosis or renal stones and no urinary   obstruction. Moderate prostatic enlargement which is unchanged.       Mild bibasilar atelectasis or early infiltrates, suggest follow-up with   serial chest x-rays         US GALLBLADDER RUQ    (Results Pending)

## 2023-11-07 ENCOUNTER — APPOINTMENT (OUTPATIENT)
Dept: GENERAL RADIOLOGY | Age: 63
DRG: 384 | End: 2023-11-07
Payer: MEDICAID

## 2023-11-07 LAB
ALBUMIN SERPL-MCNC: 2.9 G/DL (ref 3.4–5)
ALP SERPL-CCNC: 51 U/L (ref 40–129)
ALT SERPL-CCNC: 13 U/L (ref 10–40)
ANION GAP SERPL CALCULATED.3IONS-SCNC: 9 MMOL/L (ref 3–16)
AST SERPL-CCNC: 15 U/L (ref 15–37)
BASOPHILS # BLD: 0 K/UL (ref 0–0.2)
BASOPHILS NFR BLD: 0.6 %
BILIRUB DIRECT SERPL-MCNC: <0.2 MG/DL (ref 0–0.3)
BILIRUB INDIRECT SERPL-MCNC: ABNORMAL MG/DL (ref 0–1)
BILIRUB SERPL-MCNC: 0.3 MG/DL (ref 0–1)
BUN SERPL-MCNC: 17 MG/DL (ref 7–20)
CALCIUM SERPL-MCNC: 8.1 MG/DL (ref 8.3–10.6)
CHLORIDE SERPL-SCNC: 101 MMOL/L (ref 99–110)
CO2 SERPL-SCNC: 21 MMOL/L (ref 21–32)
CREAT SERPL-MCNC: 0.7 MG/DL (ref 0.8–1.3)
DEPRECATED RDW RBC AUTO: 13.5 % (ref 12.4–15.4)
EOSINOPHIL # BLD: 0.1 K/UL (ref 0–0.6)
EOSINOPHIL NFR BLD: 1.5 %
GFR SERPLBLD CREATININE-BSD FMLA CKD-EPI: >60 ML/MIN/{1.73_M2}
GLUCOSE SERPL-MCNC: 93 MG/DL (ref 70–99)
HCT VFR BLD AUTO: 37.4 % (ref 40.5–52.5)
HGB BLD-MCNC: 12.5 G/DL (ref 13.5–17.5)
LYMPHOCYTES # BLD: 0.9 K/UL (ref 1–5.1)
LYMPHOCYTES NFR BLD: 17.1 %
MAGNESIUM SERPL-MCNC: 2 MG/DL (ref 1.8–2.4)
MCH RBC QN AUTO: 30.9 PG (ref 26–34)
MCHC RBC AUTO-ENTMCNC: 33.4 G/DL (ref 31–36)
MCV RBC AUTO: 92.5 FL (ref 80–100)
MONOCYTES # BLD: 0.8 K/UL (ref 0–1.3)
MONOCYTES NFR BLD: 15.2 %
NEUTROPHILS # BLD: 3.4 K/UL (ref 1.7–7.7)
NEUTROPHILS NFR BLD: 65.6 %
PHOSPHATE SERPL-MCNC: 1.9 MG/DL (ref 2.5–4.9)
PLATELET # BLD AUTO: 187 K/UL (ref 135–450)
PMV BLD AUTO: 8.8 FL (ref 5–10.5)
POTASSIUM SERPL-SCNC: 3.8 MMOL/L (ref 3.5–5.1)
PROT SERPL-MCNC: 6 G/DL (ref 6.4–8.2)
RBC # BLD AUTO: 4.05 M/UL (ref 4.2–5.9)
SODIUM SERPL-SCNC: 131 MMOL/L (ref 136–145)
WBC # BLD AUTO: 5.1 K/UL (ref 4–11)

## 2023-11-07 PROCEDURE — G0378 HOSPITAL OBSERVATION PER HR: HCPCS

## 2023-11-07 PROCEDURE — 83735 ASSAY OF MAGNESIUM: CPT

## 2023-11-07 PROCEDURE — 2580000003 HC RX 258: Performed by: INTERNAL MEDICINE

## 2023-11-07 PROCEDURE — APPSS30 APP SPLIT SHARED TIME 16-30 MINUTES

## 2023-11-07 PROCEDURE — 6360000002 HC RX W HCPCS

## 2023-11-07 PROCEDURE — 36415 COLL VENOUS BLD VENIPUNCTURE: CPT

## 2023-11-07 PROCEDURE — 96372 THER/PROPH/DIAG INJ SC/IM: CPT

## 2023-11-07 PROCEDURE — C9113 INJ PANTOPRAZOLE SODIUM, VIA: HCPCS | Performed by: INTERNAL MEDICINE

## 2023-11-07 PROCEDURE — 99232 SBSQ HOSP IP/OBS MODERATE 35: CPT | Performed by: INTERNAL MEDICINE

## 2023-11-07 PROCEDURE — 85025 COMPLETE CBC W/AUTO DIFF WBC: CPT

## 2023-11-07 PROCEDURE — 6370000000 HC RX 637 (ALT 250 FOR IP)

## 2023-11-07 PROCEDURE — 6360000002 HC RX W HCPCS: Performed by: INTERNAL MEDICINE

## 2023-11-07 PROCEDURE — 6370000000 HC RX 637 (ALT 250 FOR IP): Performed by: INTERNAL MEDICINE

## 2023-11-07 PROCEDURE — 80048 BASIC METABOLIC PNL TOTAL CA: CPT

## 2023-11-07 PROCEDURE — G0378 HOSPITAL OBSERVATION PER HR: HCPCS | Performed by: INTERNAL MEDICINE

## 2023-11-07 PROCEDURE — 96376 TX/PRO/DX INJ SAME DRUG ADON: CPT

## 2023-11-07 PROCEDURE — 84100 ASSAY OF PHOSPHORUS: CPT

## 2023-11-07 PROCEDURE — 74240 X-RAY XM UPR GI TRC 1CNTRST: CPT

## 2023-11-07 PROCEDURE — 80076 HEPATIC FUNCTION PANEL: CPT

## 2023-11-07 PROCEDURE — 99253 IP/OBS CNSLTJ NEW/EST LOW 45: CPT | Performed by: SURGERY

## 2023-11-07 RX ORDER — BISACODYL 10 MG
20 SUPPOSITORY, RECTAL RECTAL ONCE
Status: COMPLETED | OUTPATIENT
Start: 2023-11-07 | End: 2023-11-07

## 2023-11-07 RX ORDER — METOCLOPRAMIDE HYDROCHLORIDE 5 MG/ML
10 INJECTION INTRAMUSCULAR; INTRAVENOUS EVERY 6 HOURS
Status: DISCONTINUED | OUTPATIENT
Start: 2023-11-07 | End: 2023-11-08 | Stop reason: HOSPADM

## 2023-11-07 RX ORDER — HYDROCORTISONE 25 MG/G
CREAM TOPICAL 2 TIMES DAILY PRN
Status: DISCONTINUED | OUTPATIENT
Start: 2023-11-07 | End: 2023-11-08 | Stop reason: HOSPADM

## 2023-11-07 RX ADMIN — ENOXAPARIN SODIUM 40 MG: 100 INJECTION SUBCUTANEOUS at 10:39

## 2023-11-07 RX ADMIN — HYDROCORTISONE 2.5%: 25 CREAM TOPICAL at 10:45

## 2023-11-07 RX ADMIN — DOCUSATE SODIUM 100 MG: 100 CAPSULE, LIQUID FILLED ORAL at 10:38

## 2023-11-07 RX ADMIN — AMOXICILLIN AND CLAVULANATE POTASSIUM 1 TABLET: 875; 125 TABLET, FILM COATED ORAL at 21:05

## 2023-11-07 RX ADMIN — ONDANSETRON 4 MG: 2 INJECTION INTRAMUSCULAR; INTRAVENOUS at 10:44

## 2023-11-07 RX ADMIN — ACETAMINOPHEN 650 MG: 325 TABLET ORAL at 10:44

## 2023-11-07 RX ADMIN — ACETAMINOPHEN 650 MG: 325 TABLET ORAL at 17:32

## 2023-11-07 RX ADMIN — PANTOPRAZOLE SODIUM 40 MG: 40 INJECTION, POWDER, FOR SOLUTION INTRAVENOUS at 10:39

## 2023-11-07 RX ADMIN — SODIUM CHLORIDE: 9 INJECTION, SOLUTION INTRAVENOUS at 15:57

## 2023-11-07 RX ADMIN — METOCLOPRAMIDE 10 MG: 5 INJECTION, SOLUTION INTRAMUSCULAR; INTRAVENOUS at 21:05

## 2023-11-07 RX ADMIN — PROMETHAZINE HYDROCHLORIDE 12.5 MG: 25 TABLET ORAL at 17:31

## 2023-11-07 RX ADMIN — AMOXICILLIN AND CLAVULANATE POTASSIUM 1 TABLET: 875; 125 TABLET, FILM COATED ORAL at 10:38

## 2023-11-07 RX ADMIN — METOCLOPRAMIDE 10 MG: 5 INJECTION, SOLUTION INTRAMUSCULAR; INTRAVENOUS at 16:27

## 2023-11-07 RX ADMIN — DOCUSATE SODIUM 100 MG: 100 CAPSULE, LIQUID FILLED ORAL at 21:05

## 2023-11-07 RX ADMIN — Medication 10 ML: at 10:39

## 2023-11-07 RX ADMIN — SODIUM CHLORIDE: 9 INJECTION, SOLUTION INTRAVENOUS at 05:47

## 2023-11-07 RX ADMIN — BISACODYL 20 MG: 10 SUPPOSITORY RECTAL at 10:45

## 2023-11-07 ASSESSMENT — PAIN DESCRIPTION - LOCATION: LOCATION: TEETH

## 2023-11-07 ASSESSMENT — PAIN SCALES - GENERAL: PAINLEVEL_OUTOF10: 4

## 2023-11-07 NOTE — PLAN OF CARE
Problem: Discharge Planning  Goal: Discharge to home or other facility with appropriate resources  Outcome: Progressing     Problem: Safety - Adult  Goal: Free from fall injury  Outcome: Progressing     Problem: Pain  Goal: Verbalizes/displays adequate comfort level or baseline comfort level  11/7/2023 0010 by Arabella eMjia RN  Outcome: Progressing  11/6/2023 1806 by Toshia Jones RN  Outcome: Progressing

## 2023-11-07 NOTE — CONSULTS
General Surgery   Consult Note      Pt Name: Vel Josue  MRN: [id number]  [address] [facility] 1960  Date of evaluation: 11/7/2023  Primary Care Physician: Alicia Garza MD  Patient evaluated at the request of SAM Darling  Reason for evaluation: Cholelithiasis    SUBJECTIVE:   History of Chief Complaint:    Vel Josue is a 66 y.o. male who presented with nausea and vomiting. Onset Saturday. States he couldn't keep any food or liquids down. Feels a pressure like sensation with food, like he's getting too full. Described as a \"rock laying there\" on his epigastrium. He has not had any associated pain. He has been passing lots of gas. Hasn't had a BM since Saturday morning around his sxs onset. He does have chronic constipation, stating he will often \"poop small turds\" and does not take anything for this. He has chronic and painful hemorrhoids too that hurt when he is constipated. Denies fever, chills, chest pain, sob, issues with urination. No past abdominal surgical history. Past Medical History   has a past medical history of Acute pain of right shoulder, Influenza A, Microscopic hematuria, NSVT (nonsustained ventricular tachycardia) ([address]), Pneumothorax, right, and Tobacco abuse. Past Surgical History   has no past surgical history on file. Medications  Prior to Admission medications    Medication Sig Start Date End Date Taking?  Authorizing Provider   lisinopril (PRINIVIL;ZESTRIL) 10 MG tablet Take 1 tablet by mouth daily  Patient not taking: Reported on 11/5/2023    Provider, MD Inga    Scheduled Meds:   docusate sodium  100 mg Oral BID    pantoprazole  40 mg IntraVENous Daily    amoxicillin-clavulanate  1 tablet Oral 2 times per day    sodium chloride flush  10 mL IntraVENous 2 times per day    enoxaparin  40 mg SubCUTAneous Daily     Continuous Infusions:   sodium chloride 100 mL/hr at 11/07/23 0547    sodium chloride       PRN Meds:.sodium chloride flush, sodium chloride, extremities. M/S: No cyanosis. No joint deformity. No clubbing. Neuro: Awake. Grossly nonfocal    Psych: Oriented x 3. No anxiety or agitation. LABS:   CBC:   Recent Labs     11/05/23  1615 11/06/23  0557 11/07/23  0602   WBC 8.1 6.1 5.1   HGB 14.7 13.4* 12.5*   HCT 43.9 39.8* 37.4*   MCV 92.7 93.3 92.5    177 187     BMP:   Recent Labs     11/05/23  1615 11/06/23  0557 11/07/23  0602   * 135* 131*   K 4.8 4.3 3.8   CL 99 103 101   CO2 22 24 21   PHOS  --   --  1.9*   BUN 23* 21* 17   CREATININE 0.8 0.9 0.7*     LIVER PROFILE:   Recent Labs     11/05/23 1615 11/06/23  0557 11/07/23  0602   AST 23 14* 15   ALT 13 14 13   LIPASE 9.0*  --   --    BILIDIR  --   --  <0.2   BILITOT 0.4 0.3 0.3   ALKPHOS 64 55 51     PT/INR: No results for input(s): \"PROTIME\", \"INR\" in the last 72 hours. APTT: No results for input(s): \"APTT\" in the last 72 hours. UA:No results for input(s): \"NITRITE\", \"COLORU\", \"PHUR\", \"LABCAST\", \"WBCUA\", \"RBCUA\", \"MUCUS\", \"TRICHOMONAS\", \"YEAST\", \"BACTERIA\", \"CLARITYU\", \"SPECGRAV\", \"LEUKOCYTESUR\", \"UROBILINOGEN\", \"BILIRUBINUR\", \"BLOODU\", \"GLUCOSEU\", \"AMORPHOUS\" in the last 72 hours. Invalid input(s): \"KETONESU\"      RADIOLOGY:   I have personally reviewed the following films:    [address]   Final Result   Cholelithiasis. Simple appearing renal cysts. This correlates with the CT scan. CT ABDOMEN PELVIS W IV CONTRAST Additional Contrast? None   Final Result   Questionable diffuse mild ileus. Tiny calcifications scattered in the pancreas which are unchanged may   represent chronic early calcific pancreatitis no active inflammation or mass   is seen. Mild constipation with no obvious obstruction. Cholelithiasis which is unchanged. Chronic liver changes which is unchanged. Bilateral renal cysts with no hydronephrosis or renal stones and no urinary   obstruction. Moderate prostatic enlargement which is unchanged.       Mild

## 2023-11-07 NOTE — FLOWSHEET NOTE
11/06/23 1940   Handoff   Communication Given Shift Handoff   Handoff Given To Leno Zamarripa Received From [facility] Communication Face to Face; At bedside   Time Handoff Given 1925   End of Shift Check Performed Yes

## 2023-11-07 NOTE — PROGRESS NOTES
Shift assessment complete. VSS. Pt A/OX4. C/o mild tooth pain, PRN Tylenol given. Scheduled meds given. Pt wanting to give himself suppository. Supplies and instructions given to patient. Pt denies further needs at this time. Wife at bed side. Call light within reach.

## 2023-11-07 NOTE — PROGRESS NOTES
Occupational Therapy    Order received. Pt seen by physical therapy and noted to be independent with all activities in room. Discontinue OT order at this time. Please reorder if new concerns arise.   Josy Del Rio, OTR/L #[id number]

## 2023-11-07 NOTE — PROGRESS NOTES
Shift report given to Trinity at bedside. Patient is stable. All end of shift needs have been met. No further assistance needed at this time.

## 2023-11-08 ENCOUNTER — ANESTHESIA EVENT (OUTPATIENT)
Dept: ENDOSCOPY | Age: 63
DRG: 384 | End: 2023-11-08
Payer: MEDICAID

## 2023-11-08 ENCOUNTER — ANESTHESIA (OUTPATIENT)
Dept: ENDOSCOPY | Age: 63
DRG: 384 | End: 2023-11-08
Payer: MEDICAID

## 2023-11-08 VITALS
WEIGHT: 141.9 LBS | BODY MASS INDEX: 19.22 KG/M2 | TEMPERATURE: 98.1 F | HEIGHT: 72 IN | OXYGEN SATURATION: 96 % | HEART RATE: 56 BPM | RESPIRATION RATE: 18 BRPM | DIASTOLIC BLOOD PRESSURE: 74 MMHG | SYSTOLIC BLOOD PRESSURE: 127 MMHG

## 2023-11-08 PROBLEM — K27.9 PUD (PEPTIC ULCER DISEASE): Status: ACTIVE | Noted: 2023-11-08

## 2023-11-08 PROCEDURE — 7100000011 HC PHASE II RECOVERY - ADDTL 15 MIN: Performed by: INTERNAL MEDICINE

## 2023-11-08 PROCEDURE — 6370000000 HC RX 637 (ALT 250 FOR IP): Performed by: INTERNAL MEDICINE

## 2023-11-08 PROCEDURE — 3700000001 HC ADD 15 MINUTES (ANESTHESIA): Performed by: INTERNAL MEDICINE

## 2023-11-08 PROCEDURE — 7100000010 HC PHASE II RECOVERY - FIRST 15 MIN: Performed by: INTERNAL MEDICINE

## 2023-11-08 PROCEDURE — 2500000003 HC RX 250 WO HCPCS: Performed by: NURSE ANESTHETIST, CERTIFIED REGISTERED

## 2023-11-08 PROCEDURE — 99238 HOSP IP/OBS DSCHRG MGMT 30/<: CPT | Performed by: INTERNAL MEDICINE

## 2023-11-08 PROCEDURE — 3609012400 HC EGD TRANSORAL BIOPSY SINGLE/MULTIPLE: Performed by: INTERNAL MEDICINE

## 2023-11-08 PROCEDURE — 96372 THER/PROPH/DIAG INJ SC/IM: CPT

## 2023-11-08 PROCEDURE — 96376 TX/PRO/DX INJ SAME DRUG ADON: CPT

## 2023-11-08 PROCEDURE — 0DB68ZX EXCISION OF STOMACH, VIA NATURAL OR ARTIFICIAL OPENING ENDOSCOPIC, DIAGNOSTIC: ICD-10-PCS | Performed by: INTERNAL MEDICINE

## 2023-11-08 PROCEDURE — 6360000002 HC RX W HCPCS: Performed by: INTERNAL MEDICINE

## 2023-11-08 PROCEDURE — 2580000003 HC RX 258: Performed by: INTERNAL MEDICINE

## 2023-11-08 PROCEDURE — 88305 TISSUE EXAM BY PATHOLOGIST: CPT

## 2023-11-08 PROCEDURE — 6360000002 HC RX W HCPCS

## 2023-11-08 PROCEDURE — 1200000000 HC SEMI PRIVATE

## 2023-11-08 PROCEDURE — G0378 HOSPITAL OBSERVATION PER HR: HCPCS

## 2023-11-08 PROCEDURE — 6360000002 HC RX W HCPCS: Performed by: NURSE ANESTHETIST, CERTIFIED REGISTERED

## 2023-11-08 PROCEDURE — 99232 SBSQ HOSP IP/OBS MODERATE 35: CPT | Performed by: SURGERY

## 2023-11-08 PROCEDURE — 0DB98ZX EXCISION OF DUODENUM, VIA NATURAL OR ARTIFICIAL OPENING ENDOSCOPIC, DIAGNOSTIC: ICD-10-PCS | Performed by: INTERNAL MEDICINE

## 2023-11-08 PROCEDURE — C9113 INJ PANTOPRAZOLE SODIUM, VIA: HCPCS | Performed by: INTERNAL MEDICINE

## 2023-11-08 PROCEDURE — 2580000003 HC RX 258: Performed by: ANESTHESIOLOGY

## 2023-11-08 PROCEDURE — 2709999900 HC NON-CHARGEABLE SUPPLY: Performed by: INTERNAL MEDICINE

## 2023-11-08 PROCEDURE — 3700000000 HC ANESTHESIA ATTENDED CARE: Performed by: INTERNAL MEDICINE

## 2023-11-08 RX ORDER — SODIUM CHLORIDE 0.9 % (FLUSH) 0.9 %
5-40 SYRINGE (ML) INJECTION PRN
Status: DISCONTINUED | OUTPATIENT
Start: 2023-11-08 | End: 2023-11-08 | Stop reason: HOSPADM

## 2023-11-08 RX ORDER — LIDOCAINE HYDROCHLORIDE 20 MG/ML
INJECTION, SOLUTION INFILTRATION; PERINEURAL PRN
Status: DISCONTINUED | OUTPATIENT
Start: 2023-11-08 | End: 2023-11-08 | Stop reason: SDUPTHER

## 2023-11-08 RX ORDER — FAMOTIDINE 10 MG/ML
20 INJECTION, SOLUTION INTRAVENOUS ONCE
Status: DISCONTINUED | OUTPATIENT
Start: 2023-11-08 | End: 2023-11-08 | Stop reason: HOSPADM

## 2023-11-08 RX ORDER — PROPOFOL 10 MG/ML
INJECTION, EMULSION INTRAVENOUS PRN
Status: DISCONTINUED | OUTPATIENT
Start: 2023-11-08 | End: 2023-11-08 | Stop reason: SDUPTHER

## 2023-11-08 RX ORDER — SODIUM CHLORIDE 0.9 % (FLUSH) 0.9 %
5-40 SYRINGE (ML) INJECTION EVERY 12 HOURS SCHEDULED
Status: DISCONTINUED | OUTPATIENT
Start: 2023-11-08 | End: 2023-11-08 | Stop reason: HOSPADM

## 2023-11-08 RX ORDER — SODIUM CHLORIDE 9 MG/ML
INJECTION, SOLUTION INTRAVENOUS PRN
Status: DISCONTINUED | OUTPATIENT
Start: 2023-11-08 | End: 2023-11-08 | Stop reason: HOSPADM

## 2023-11-08 RX ORDER — PANTOPRAZOLE SODIUM 40 MG/1
40 TABLET, DELAYED RELEASE ORAL 2 TIMES DAILY
Qty: 120 TABLET | Refills: 0 | Status: SHIPPED | OUTPATIENT
Start: 2023-11-08 | End: 2024-01-07

## 2023-11-08 RX ORDER — SODIUM CHLORIDE, SODIUM LACTATE, POTASSIUM CHLORIDE, CALCIUM CHLORIDE 600; 310; 30; 20 MG/100ML; MG/100ML; MG/100ML; MG/100ML
INJECTION, SOLUTION INTRAVENOUS CONTINUOUS
Status: DISCONTINUED | OUTPATIENT
Start: 2023-11-08 | End: 2023-11-08

## 2023-11-08 RX ORDER — AMOXICILLIN AND CLAVULANATE POTASSIUM 875; 125 MG/1; MG/1
1 TABLET, FILM COATED ORAL 2 TIMES DAILY
Qty: 10 TABLET | Refills: 0 | Status: SHIPPED | OUTPATIENT
Start: 2023-11-08 | End: 2023-11-13

## 2023-11-08 RX ADMIN — LIDOCAINE HYDROCHLORIDE 60 MG: 20 INJECTION, SOLUTION INFILTRATION; PERINEURAL at 08:47

## 2023-11-08 RX ADMIN — PANTOPRAZOLE SODIUM 40 MG: 40 INJECTION, POWDER, FOR SOLUTION INTRAVENOUS at 10:07

## 2023-11-08 RX ADMIN — SODIUM CHLORIDE: 9 INJECTION, SOLUTION INTRAVENOUS at 13:48

## 2023-11-08 RX ADMIN — ENOXAPARIN SODIUM 40 MG: 100 INJECTION SUBCUTANEOUS at 10:07

## 2023-11-08 RX ADMIN — METOCLOPRAMIDE 10 MG: 5 INJECTION, SOLUTION INTRAMUSCULAR; INTRAVENOUS at 04:07

## 2023-11-08 RX ADMIN — METOCLOPRAMIDE 10 MG: 5 INJECTION, SOLUTION INTRAMUSCULAR; INTRAVENOUS at 10:07

## 2023-11-08 RX ADMIN — SODIUM CHLORIDE, POTASSIUM CHLORIDE, SODIUM LACTATE AND CALCIUM CHLORIDE: 600; 310; 30; 20 INJECTION, SOLUTION INTRAVENOUS at 11:20

## 2023-11-08 RX ADMIN — DOCUSATE SODIUM 100 MG: 100 CAPSULE, LIQUID FILLED ORAL at 10:07

## 2023-11-08 RX ADMIN — SODIUM CHLORIDE: 9 INJECTION, SOLUTION INTRAVENOUS at 02:40

## 2023-11-08 RX ADMIN — AMOXICILLIN AND CLAVULANATE POTASSIUM 1 TABLET: 875; 125 TABLET, FILM COATED ORAL at 10:07

## 2023-11-08 RX ADMIN — PROPOFOL 175 MG: 10 INJECTION, EMULSION INTRAVENOUS at 08:47

## 2023-11-08 ASSESSMENT — ENCOUNTER SYMPTOMS: SHORTNESS OF BREATH: 1

## 2023-11-08 ASSESSMENT — PAIN SCALES - GENERAL
PAINLEVEL_OUTOF10: 0
PAINLEVEL_OUTOF10: 0

## 2023-11-08 ASSESSMENT — COPD QUESTIONNAIRES: CAT_SEVERITY: MILD

## 2023-11-08 ASSESSMENT — PAIN - FUNCTIONAL ASSESSMENT: PAIN_FUNCTIONAL_ASSESSMENT: NONE - DENIES PAIN

## 2023-11-08 NOTE — PROGRESS NOTES
Patient tolerated his meal, discharge instructions given verbally and written, patient expressed full understanding.

## 2023-11-08 NOTE — PLAN OF CARE
Problem: Discharge Planning  Goal: Discharge to home or other facility with appropriate resources  11/7/2023 2315 by Remi Moreno RN  Outcome: Progressing  11/7/2023 2150 by Pipe Aguirre RN  Outcome: Progressing     Problem: Safety - Adult  Goal: Free from fall injury  11/7/2023 2315 by Remi Moreno RN  Outcome: Progressing  11/7/2023 2150 by Pipe Aguirre RN  Outcome: Progressing     Problem: Pain  Goal: Verbalizes/displays adequate comfort level or baseline comfort level  11/7/2023 2315 by Remi Moreno RN  Outcome: Progressing  11/7/2023 2150 by Pipe Aguirre RN  Outcome: Progressing

## 2023-11-08 NOTE — PROGRESS NOTES
Bed side report given to TERESSA Claire. Patient reports eating solid food for dinner and tolerating well, no nausea or vomiting after dinner. Denies further needs at this time. Call light within reach.

## 2023-11-08 NOTE — PROGRESS NOTES
Dr. Radha Almazan at bedside, pt is a/o in stable condition with no complaints of pain. Pt denied ice chips and warm blanket was given.

## 2023-11-08 NOTE — PLAN OF CARE
Problem: Discharge Planning  Goal: Discharge to home or other facility with appropriate resources  Outcome: Completed     Problem: Safety - Adult  Goal: Free from fall injury  Outcome: Completed     Problem: Pain  Goal: Verbalizes/displays adequate comfort level or baseline comfort level  Outcome: Completed  Flowsheets  Taken 11/8/2023 1415  Verbalizes/displays adequate comfort level or baseline comfort level:   Encourage patient to monitor pain and request assistance   Assess pain using appropriate pain scale   Administer analgesics based on type and severity of pain and evaluate response   Implement non-pharmacological measures as appropriate and evaluate response   Consider cultural and social influences on pain and pain management   Notify Licensed Independent Practitioner if interventions unsuccessful or patient reports new pain  Taken 11/8/2023 0945  Verbalizes/displays adequate comfort level or baseline comfort level:   Encourage patient to monitor pain and request assistance   Assess pain using appropriate pain scale   Administer analgesics based on type and severity of pain and evaluate response   Implement non-pharmacological measures as appropriate and evaluate response   Consider cultural and social influences on pain and pain management   Notify Licensed Independent Practitioner if interventions unsuccessful or patient reports new pain     Problem: Chronic Conditions and Co-morbidities  Goal: Patient's chronic conditions and co-morbidity symptoms are monitored and maintained or improved  Outcome: Completed

## 2023-11-08 NOTE — PROGRESS NOTES
Patient with discharge orders but needs to tolerate his diet first per Dr. Scott Hicks. Dietary to send him a tray.

## 2023-11-08 NOTE — ANESTHESIA POSTPROCEDURE EVALUATION
Department of Anesthesiology  Postprocedure Note    Patient: Lily Thomas  MRN: [id number]  YOB: 1960  Date of evaluation: 11/8/2023      Procedure Summary     Date: 11/08/23 Room / Location: [facility] ENDO 01 / [facility]    Anesthesia Start: 1735 Anesthesia Stop: 6464    Procedure: EGD BIOPSY Diagnosis:       Nausea and vomiting, unspecified vomiting type      (Nausea and vomiting, unspecified vomiting type [R11.2])    Surgeons: Staci Bowling MD Responsible Provider: Eneida Freedman MD    Anesthesia Type: MAC ASA Status: 2          Anesthesia Type: No value filed.     Sachi Phase I: Sachi Score: 10    Sachi Phase II: Sachi Score: 10      Anesthesia Post Evaluation    Patient location during evaluation: PACU  Level of consciousness: awake  Airway patency: patent  Nausea & Vomiting: no nausea  Complications: no  Cardiovascular status: blood pressure returned to baseline  Respiratory status: acceptable  Hydration status: euvolemic  Pain management: adequate

## 2023-11-08 NOTE — PROGRESS NOTES
Transport here to take pt back to 2W. Pt is a/o in stable condition. Spoke with [facility], to let her know that pt is on his way back to the floor.  Report called to RN from ENDO

## 2023-11-08 NOTE — OP NOTE
[address] [facility] [address] [place]                 [id number] [address]                                OPERATIVE REPORT    PATIENT NAME: Leticia Reece                     :        1960  MED REC NO:   [id number]                          ROOM:       8985  ACCOUNT NO:   [de-identified]                           ADMIT DATE: 2023  PROVIDER:     Dalila López MD    DATE OF PROCEDURE:  2023    PREPROCEDURE DIAGNOSIS:  Nausea and vomiting. POSTPROCEDURE DIAGNOSES:  1. Single 5 mm clean-based duodenal bulb ulcer. 2.  Gastritis. 3.  Otherwise normal EGD. OPERATION PERFORMED:  EGD with biopsy. SURGEON:  Dalila López MD    PROCEDURE INDICATIONS:  This is a 51-year-old male with history of  pneumothorax, admitted with ileus, likely secondary to biliary _____,  but cannot exclude peptic ulcer disease and H. pylori infection. EGD is  being performed to rule out high risk pathology. Upper GI series was  incomplete due to the patient's symptoms of intractable nausea and  vomiting. He did have gallstone seen on recent imaging. His LFTs are  normal.    MEDICATIONS:  MAC per Anesthesia. PROCEDURE IN DETAIL:  Informed consent obtained after discussing risks,  benefits, alternatives. Full history and physical was performed. The  patient was classified as ASA class III. Medications were given by  Anesthesia. Cardiopulmonary status was continuously monitored  throughout the procedure. The patient was placed in left lateral  decubitus position. Once adequately sedated, a standard upper  gastroscope was inserted in the mouth and advanced under direct  visualization to the second portion of the duodenum.   The entire mucosa  of the esophagus, stomach (retroflex and forward views), duodenum (bulb,  sweep, and second portion) were examined carefully during withdrawal.   The patient tolerated the procedure well without any

## 2023-11-08 NOTE — PLAN OF CARE
Problem: Discharge Planning  Goal: Discharge to home or other facility with appropriate resources  11/7/2023 2315 by Mars Duran RN  Outcome: Progressing  11/7/2023 2150 by Levon Sharp RN  Outcome: Progressing     Problem: Safety - Adult  Goal: Free from fall injury  11/7/2023 2315 by Mars Duran RN  Outcome: Progressing  11/7/2023 2150 by Levon Sharp RN  Outcome: Progressing     Problem: Pain  Goal: Verbalizes/displays adequate comfort level or baseline comfort level  Recent Flowsheet Documentation  Taken 11/8/2023 0945 by Glo Pearson RN  Verbalizes/displays adequate comfort level or baseline comfort level:   Encourage patient to monitor pain and request assistance   Assess pain using appropriate pain scale   Administer analgesics based on type and severity of pain and evaluate response   Implement non-pharmacological measures as appropriate and evaluate response   Consider cultural and social influences on pain and pain management   Notify Licensed Independent Practitioner if interventions unsuccessful or patient reports new pain  11/7/2023 2315 by Mars Duran RN  Outcome: Progressing  11/7/2023 2150 by Levon Sharp RN  Outcome: Progressing

## 2023-11-08 NOTE — BRIEF OP NOTE
Brief Postoperative Note      Patient: Donita Gleason  YOB: 1960  MRN: [id number]    Date of Procedure: 11/8/2023    Pre-Op Diagnosis Codes:     * Nausea and vomiting, unspecified vomiting type [R11.2]    Post-Op Diagnosis:  gastritis, small clean based duodenal bulb ulcer       Procedure(s):  EGD BIOPSY    Surgeon(s):  Collin Torres MD    Assistant:  * No surgical staff found *    Anesthesia: Monitor Anesthesia Care    Estimated Blood Loss (mL): Minimal    Complications: None    Specimens:   ID Type Source Tests Collected by Time Destination   A :  Tissue Biopsy SURGICAL PATHOLOGY Collin Torres MD 11/8/2023 8281    B :  Tissue Biopsy SURGICAL PATHOLOGY Collin Torres MD 11/8/2023 6483        Implants:  * No implants in log *      Drains: * No LDAs found *    Findings: gastritis, small clean based duodenal bulb ulcer    Recommendation  Continue supportive care  PPI BID x 8wks  Await pathololgy  Resume diet  OK to d/c from GI standpoint  Outpatient colonoscopy upon discharge      Electronically signed by Collin Torres MD on 11/8/2023 at 8:57 AM

## 2023-11-08 NOTE — PROGRESS NOTES
Shift report given to Monique Ortiz at face to face. Patient is stable. All end of shift needs have been met. No further assistance needed at this time.

## 2023-11-08 NOTE — DISCHARGE SUMMARY
teeth  Chest:  Clear to auscultation bilaterally, no added sounds  Cardiovascular:  RRR S1S2 heard, no murmurs or gallops  Abdomen:  Soft, undistended, mild epigastric tenderness resolved now , no organomegaly, BS present  Extremities: No edema or cyanosis. Distal pulses well felt  Neurological : grossly normal       EGD    POSTPROCEDURE DIAGNOSES:  1. Single 5 mm clean-based duodenal bulb ulcer. 2.  Gastritis. 3.  Otherwise normal EGD. CBC:   Recent Labs     11/05/23  1615 11/06/23  0557 11/07/23  0602   WBC 8.1 6.1 5.1   HGB 14.7 13.4* 12.5*   HCT 43.9 39.8* 37.4*   MCV 92.7 93.3 92.5    177 187     BMP:   Recent Labs     11/05/23 1615 11/06/23 0557 11/07/23  0602   * 135* 131*   K 4.8 4.3 3.8   CL 99 103 101   CO2 22 24 21   PHOS  --   --  1.9*   BUN 23* 21* 17   CREATININE 0.8 0.9 0.7*     LIVER PROFILE:   Recent Labs     11/05/23 1615 11/06/23  0557 11/07/23  0602   AST 23 14* 15   ALT 13 14 13   LIPASE 9.0*  --   --    BILIDIR  --   --  <0.2   BILITOT 0.4 0.3 0.3   ALKPHOS 64 55 51       CULTURES    Results       Procedure Component Value Units Date/Time    COVID-19 & Influenza Combo [[id number]] Collected: 11/05/23 1623    Order Status: Completed Specimen: Nasopharyngeal Swab Updated: 11/05/23 1651     SARS-CoV-2 RNA, RT PCR NOT DETECTED     Comment: Not Detected results do not preclude SARS-CoV-2 infection and  should not be used as the sole basis for patient management  decisions. Results must be combined with clinical observations,  patient history, and epidemiological information. Testing was performed using RAYMUNDO NADIA SARS-CoV-2 and Influenza A/B  nucleic acid assay.  This test is a multiplex Real-Time Reverse  Transcriptase Polymerase Chain Reaction (RT-PCR)-based in vitro  diagnostic test intended for the qualitative detection of nucleic  acids from SARS-CoV-2, influenza A, and influenza B in nasopharyngeal  and nasal swab specimens for use under the [facility] Emergency Use  Authorization (EUA) only. Patient Fact Sheet:  FindDrives.pl  Provider Fact Sheet: FindDrives.pl  EUA: FindDrives.pl  IFU: FindDrives.pl    Methodology:  RT-PCR          INFLUENZA A NOT DETECTED     INFLUENZA B NOT DETECTED    Rapid RSV Antigen [[id number]] Collected: 11/05/23 1615    Order Status: Completed Specimen: Nasopharyngeal Swab Updated: 11/05/23 1659     RSV Rapid Ag Negative             RADIOLOGY    FL UGI   Final Result   Incomplete examination secondary to patient nausea and vomiting with no gross   abnormality of the visualized esophagus stomach identified. These findings were communicated to [facility] after the examination   who allergy understanding. US GALLBLADDER RUQ   Final Result   Cholelithiasis. Simple appearing renal cysts. This correlates with the CT scan. CT ABDOMEN PELVIS W IV CONTRAST Additional Contrast? None   Final Result   Questionable diffuse mild ileus. Tiny calcifications scattered in the pancreas which are unchanged may   represent chronic early calcific pancreatitis no active inflammation or mass   is seen. Mild constipation with no obvious obstruction. Cholelithiasis which is unchanged. Chronic liver changes which is unchanged. Bilateral renal cysts with no hydronephrosis or renal stones and no urinary   obstruction. Moderate prostatic enlargement which is unchanged.       Mild bibasilar atelectasis or early infiltrates, suggest follow-up with   serial chest x-rays              Discharge Medications     Medication List        START taking these medications      amoxicillin-clavulanate 875-125 MG per tablet  Commonly known as: AUGMENTIN  Take 1 tablet by mouth 2 times daily for 5 days     pantoprazole 40 MG tablet  Commonly known as: PROTONIX  Take 1 tablet by mouth in the morning and at

## 2023-11-20 NOTE — PROGRESS NOTES
Physician Progress Note      PATIENT:               Feli Brizuela  CSN #:                  [id number]  :                       1960  ADMIT DATE:       2023 3:36 PM  [address] [facility] DATE:        2023 3:58 PM  RESPONDING  PROVIDER #:        Ruth Quinones MD          QUERY TEXT:    Pt admitted with nausea and vomiting. Pt noted to have ileus and duodenal   ulcer. If possible, please document in progress notes and discharge summary if   you are evaluating and /or treating any of the following: The medical record reflects the following:  Risk Factors: PUD, ileus, dental caries, Cholelithiasis without acute   cholecystitis, constipation  Clinical Indicators: CT- Questionable diffuse mild ileus. EGD- small 5 mm duodenal bulb ulcer  Per [facility]- \"Ileus with intractable nausea and vomiting: Peptic ulcer   disease. ..GI did EGD showing small 5 mm duodenal bulb ulcer  Started PPI\"  Treatment: stool regimen for constipation, PPI, GI consult for EGD, imaging,   supportive care    Thank you,  Kindra English RN BSN CRCR [email or web address]  Options provided:  -- Nausea/vomiting multifactorial; due to ileus and duodenal ulcer  -- Nausea/vomiting due to ileus  -- Nausea/vomiting due to duodenal ulcer. -- Other - I will add my own diagnosis  -- Disagree - Not applicable / Not valid  -- Disagree - Clinically unable to determine / Unknown  -- Refer to Clinical Documentation Reviewer    PROVIDER RESPONSE TEXT:    Nausea/vomiting multifactorial; due to ileus and duodenal ulcer. Query created by: Curly Quinn on 2023 12:19 PM      Electronically signed by:   Ruth Quinones MD 2023 10:51 AM

## 2024-07-19 ENCOUNTER — HOSPITAL ENCOUNTER (EMERGENCY)
Age: 64
Discharge: HOME | End: 2024-07-19
Attending: EMERGENCY MEDICINE
Payer: COMMERCIAL

## 2024-07-19 VITALS
TEMPERATURE: 98.7 F | HEART RATE: 56 BPM | WEIGHT: 142.4 LBS | RESPIRATION RATE: 18 BRPM | BODY MASS INDEX: 18.87 KG/M2 | DIASTOLIC BLOOD PRESSURE: 70 MMHG | OXYGEN SATURATION: 97 % | HEIGHT: 73 IN | SYSTOLIC BLOOD PRESSURE: 117 MMHG

## 2024-07-19 DIAGNOSIS — T15.91XA FOREIGN BODY OF RIGHT EYE, INITIAL ENCOUNTER: Primary | ICD-10-CM

## 2024-07-19 PROCEDURE — 6370000000 HC RX 637 (ALT 250 FOR IP): Performed by: EMERGENCY MEDICINE

## 2024-07-19 PROCEDURE — 6370000000 HC RX 637 (ALT 250 FOR IP)

## 2024-07-19 PROCEDURE — 99283 EMERGENCY DEPT VISIT LOW MDM: CPT

## 2024-07-19 PROCEDURE — 90471 IMMUNIZATION ADMIN: CPT | Performed by: EMERGENCY MEDICINE

## 2024-07-19 PROCEDURE — 90715 TDAP VACCINE 7 YRS/> IM: CPT | Performed by: EMERGENCY MEDICINE

## 2024-07-19 PROCEDURE — 65205 REMOVE FOREIGN BODY FROM EYE: CPT

## 2024-07-19 PROCEDURE — 6360000002 HC RX W HCPCS: Performed by: EMERGENCY MEDICINE

## 2024-07-19 RX ORDER — TETRACAINE HYDROCHLORIDE 5 MG/ML
1 SOLUTION OPHTHALMIC ONCE
Status: COMPLETED | OUTPATIENT
Start: 2024-07-19 | End: 2024-07-19

## 2024-07-19 RX ORDER — ERYTHROMYCIN 5 MG/G
OINTMENT OPHTHALMIC ONCE
Status: COMPLETED | OUTPATIENT
Start: 2024-07-19 | End: 2024-07-19

## 2024-07-19 RX ORDER — ERYTHROMYCIN 5 MG/G
OINTMENT OPHTHALMIC
Status: COMPLETED
Start: 2024-07-19 | End: 2024-07-19

## 2024-07-19 RX ORDER — ERYTHROMYCIN 5 MG/G
1 OINTMENT OPHTHALMIC EVERY 6 HOURS
Qty: 1 EACH | Refills: 0 | Status: SHIPPED | OUTPATIENT
Start: 2024-07-19 | End: 2024-07-24

## 2024-07-19 RX ADMIN — FLUORESCEIN SODIUM 1 MG: 1 STRIP OPHTHALMIC at 16:52

## 2024-07-19 RX ADMIN — ERYTHROMYCIN: 5 OINTMENT OPHTHALMIC at 16:52

## 2024-07-19 RX ADMIN — TETANUS TOXOID, REDUCED DIPHTHERIA TOXOID AND ACELLULAR PERTUSSIS VACCINE, ADSORBED 0.5 ML: 5; 2.5; 8; 8; 2.5 SUSPENSION INTRAMUSCULAR at 16:53

## 2024-07-19 RX ADMIN — TETRACAINE HYDROCHLORIDE 1 DROP: 5 SOLUTION OPHTHALMIC at 16:51

## 2024-07-19 RX ADMIN — ERYTHROMYCIN: 5 OINTMENT OPHTHALMIC at 17:35

## 2024-07-19 ASSESSMENT — LIFESTYLE VARIABLES
HOW OFTEN DO YOU HAVE A DRINK CONTAINING ALCOHOL: NEVER
HOW MANY STANDARD DRINKS CONTAINING ALCOHOL DO YOU HAVE ON A TYPICAL DAY: PATIENT DOES NOT DRINK

## 2024-07-19 ASSESSMENT — PAIN DESCRIPTION - PAIN TYPE: TYPE: ACUTE PAIN

## 2024-07-19 ASSESSMENT — PAIN SCALES - GENERAL
PAINLEVEL_OUTOF10: 4
PAINLEVEL_OUTOF10: 0

## 2024-07-19 ASSESSMENT — VISUAL ACUITY
OS: BLIND LEFT EYE
OD: 20/50
OU: LEFT EYE BLIND

## 2024-07-19 ASSESSMENT — PAIN DESCRIPTION - LOCATION: LOCATION: EYE

## 2024-07-19 ASSESSMENT — PAIN - FUNCTIONAL ASSESSMENT: PAIN_FUNCTIONAL_ASSESSMENT: 0-10

## 2024-07-19 ASSESSMENT — PAIN DESCRIPTION - DESCRIPTORS: DESCRIPTORS: BURNING

## 2024-07-19 NOTE — DISCHARGE INSTRUCTIONS
Take Tylenol as needed for pain.  Use erythromycin ointment to avoid infection.    Follow-up with ophthalmology over the next 2 to 3 days for any persistent symptoms to ensure your eye is doing well.  I would follow up for repeat eye staining to make sure no foreign bodies remain.      Return to the emergency department immediately over the next 6 to 24 hours for any worsening vision, severe eye pain, new vomiting, fever, or any other issues, especially since the seizure only.    Make sure to wear eye protection if grinding metal.

## 2024-07-19 NOTE — ED PROVIDER NOTES
[facility] ED  EMERGENCY DEPARTMENT ENCOUNTER        Pt Name: Dany Redd  MRN: [id number]  Birthdate 1960  Date of evaluation: 7/19/2024  Provider: Yosi Hudson MD  PCP: Alta Steward MD      CHIEF COMPLAINT       Chief Complaint   Patient presents with    Eye Problem     Possible piece of metal in right eye. Pt states he was grinding on a screw around noon today.        HISTORY OFPRESENT ILLNESS   (Location/Symptom, Timing/Onset, Context/Setting, Quality, Duration, Modifying Factors,Severity)  Note limiting factors.     Dany Redd is a 63 y.o. male presenting today due to concern for feeling like there is a piece of metal stuck in his right eye and being unable to get it out.  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  He denies any significant visual changes since the incident.  It happened at noon today.  He is unsure of his last tetanus shot.  He has slight eye discomfort related to the sensation that there is a foreign body in his eye and he feels like it is down by his eyelid currently.  He was not wearing safety glasses.  He was trying to grind down a screw when it happened.  This was at home.  He denies any chest pain or shortness of breath.  He denies any headache.  No nausea or vomiting.  No fever.  Due to concern for foreign body on his right eye, he came to ED for further evaluation.        REVIEW OF SYSTEMS    (2-9 systems for level 4, 10 or more for level 5)     Review of Systems   Constitutional:  Negative for chills and fever.   HENT:  Negative for sinus pain.    Eyes:  Positive for pain (Mild) and redness. Negative for photophobia and visual disturbance (denies any visual concerns today since the episode earlier this afternoon).   Respiratory:  Negative for shortness of breath.    Cardiovascular:  Negative for chest pain.   Gastrointestinal:  Negative for nausea and vomiting.   Skin:  Negative for wound.   Neurological:  Negative for weakness and  Right eye: Foreign body present. No corneal ulcer, hyphema, hypopyon or photophobia.        Comments: OD = 20/50   Normal pupillary response to right eye    Cardiovascular:      Rate and Rhythm: Normal rate.   Pulmonary:      Effort: Pulmonary effort is normal. No respiratory distress.      Breath sounds: No stridor.   Musculoskeletal:         General: No deformity.      Cervical back: Neck supple. No rigidity.   Skin:     General: Skin is warm and dry.      Coloration: Skin is not jaundiced or pale.      Findings: No bruising, erythema, lesion or rash.   Neurological:      General: No focal deficit present.      Mental Status: He is alert. Mental status is at baseline.      GCS: GCS eye subscore is 4. GCS verbal subscore is 5. GCS motor subscore is 6.      Cranial Nerves: No dysarthria.      Motor: No tremor, atrophy or seizure activity.      Gait: Gait is intact. Gait normal.   Psychiatric:         Mood and Affect: Mood normal.         Behavior: Behavior normal.             DIAGNOSTIC RESULTS   :    Labs Reviewed - No data to display    All other labs were within normal range or not returned asof this dictation.    EKG: All EKG's are interpreted by the Emergency Department Physician who either signs or Co-signs this chart in the absence of a cardiologist.        RADIOLOGY:   Non-plain film images such as CT, Ultrasound and MRI are read by the radiologist. Plainradiographic images are visualized and preliminarily interpreted by the  ED Provider with the belowfindings:        Interpretation per the Radiologist below, if available at the time of this note:    No orders to display         PROCEDURES   Unless otherwise noted below, none       Foreign body removal:  right eye  Patient gave verbal consent for foreign body removal involving the right eye.  There was a small piece of metal in between his conjunctiva and eyelid to the right lower eye which I was able to remove with my glove after using tetracaine to numb

## 2024-07-19 NOTE — ED NOTES
Patient is being discharged from ed. They are alert and oriented times 4. Speech is clear. Denies pain. Discharge education provided. Patient verbalized understanding. Family stated that they will make their own follow up appointments. They verbalized when they should follow up. Patient verbalized that they have all of their belongings. Electronically signed by Rashi Mayfield RN on 7/19/2024 at 5:43 PM    
No

## 2024-07-20 ASSESSMENT — ENCOUNTER SYMPTOMS
PHOTOPHOBIA: 0
SHORTNESS OF BREATH: 0
EYE REDNESS: 1
VOMITING: 0
NAUSEA: 0
EYE PAIN: 1
SINUS PAIN: 0

## 2024-07-20 ASSESSMENT — VISUAL ACUITY: OU: 1

## (undated) DEVICE — YANKAUER,BULB TIP,W/O VENT,RIGID,STERILE: Brand: MEDLINE

## (undated) DEVICE — FORCEP BX STD CAP 240CM RAD JAW 4

## (undated) DEVICE — CANNULA,OXY,ADULT,SUPERSOFT,W/7'TUB,SC: Brand: MEDLINE INDUSTRIES, INC.

## (undated) DEVICE — CONMED SCOPE SAVER BITE BLOCK, 20X27 MM: Brand: SCOPE SAVER

## (undated) DEVICE — ENDOSCOPIC KIT 2 12 FT OP4 DE2 GWN SYR